# Patient Record
Sex: MALE | Race: ASIAN | NOT HISPANIC OR LATINO | Employment: FULL TIME | URBAN - METROPOLITAN AREA
[De-identification: names, ages, dates, MRNs, and addresses within clinical notes are randomized per-mention and may not be internally consistent; named-entity substitution may affect disease eponyms.]

---

## 2021-04-08 DIAGNOSIS — Z23 ENCOUNTER FOR IMMUNIZATION: ICD-10-CM

## 2021-04-19 ENCOUNTER — IMMUNIZATIONS (OUTPATIENT)
Dept: FAMILY MEDICINE CLINIC | Facility: HOSPITAL | Age: 31
End: 2021-04-19

## 2021-04-19 DIAGNOSIS — Z23 ENCOUNTER FOR IMMUNIZATION: Primary | ICD-10-CM

## 2021-04-19 PROCEDURE — 91300 SARS-COV-2 / COVID-19 MRNA VACCINE (PFIZER-BIONTECH) 30 MCG: CPT

## 2021-04-19 PROCEDURE — 0001A SARS-COV-2 / COVID-19 MRNA VACCINE (PFIZER-BIONTECH) 30 MCG: CPT

## 2021-05-13 ENCOUNTER — IMMUNIZATIONS (OUTPATIENT)
Dept: FAMILY MEDICINE CLINIC | Facility: HOSPITAL | Age: 31
End: 2021-05-13

## 2021-05-13 DIAGNOSIS — Z23 ENCOUNTER FOR IMMUNIZATION: Primary | ICD-10-CM

## 2021-05-13 PROCEDURE — 91300 SARS-COV-2 / COVID-19 MRNA VACCINE (PFIZER-BIONTECH) 30 MCG: CPT

## 2021-05-13 PROCEDURE — 0002A SARS-COV-2 / COVID-19 MRNA VACCINE (PFIZER-BIONTECH) 30 MCG: CPT

## 2021-09-03 ENCOUNTER — NEW PATIENT (OUTPATIENT)
Dept: URBAN - METROPOLITAN AREA CLINIC 6 | Facility: CLINIC | Age: 31
End: 2021-09-03

## 2021-09-03 DIAGNOSIS — H52.13: ICD-10-CM

## 2021-09-03 PROCEDURE — 92004 COMPRE OPH EXAM NEW PT 1/>: CPT

## 2021-09-03 PROCEDURE — 92015 DETERMINE REFRACTIVE STATE: CPT

## 2021-09-03 ASSESSMENT — KERATOMETRY
OS_AXISANGLE2_DEGREES: 79
OS_AXISANGLE_DEGREES: 169
OD_K2POWER_DIOPTERS: 44.25
OS_K2POWER_DIOPTERS: 44.00
OS_K1POWER_DIOPTERS: 43.25
OD_K1POWER_DIOPTERS: 42.75
OD_AXISANGLE2_DEGREES: 86
OD_AXISANGLE_DEGREES: 176

## 2021-09-03 ASSESSMENT — VISUAL ACUITY
OS_CC: J1+
OS_CC: 20/30-2
OD_CC: J1+
OD_CC: 20/20-3

## 2021-09-03 ASSESSMENT — TONOMETRY
OD_IOP_MMHG: 16
OS_IOP_MMHG: 16

## 2021-12-18 ENCOUNTER — IMMUNIZATIONS (OUTPATIENT)
Dept: FAMILY MEDICINE CLINIC | Facility: HOSPITAL | Age: 31
End: 2021-12-18

## 2021-12-18 DIAGNOSIS — Z23 ENCOUNTER FOR IMMUNIZATION: Primary | ICD-10-CM

## 2021-12-18 PROCEDURE — 0001A COVID-19 PFIZER VACC 0.3 ML: CPT

## 2021-12-18 PROCEDURE — 91300 COVID-19 PFIZER VACC 0.3 ML: CPT

## 2022-06-20 ENCOUNTER — OFFICE VISIT (OUTPATIENT)
Dept: FAMILY MEDICINE CLINIC | Facility: CLINIC | Age: 32
End: 2022-06-20
Payer: COMMERCIAL

## 2022-06-20 VITALS
BODY MASS INDEX: 24.8 KG/M2 | DIASTOLIC BLOOD PRESSURE: 70 MMHG | TEMPERATURE: 98.1 F | SYSTOLIC BLOOD PRESSURE: 140 MMHG | WEIGHT: 158 LBS | HEART RATE: 93 BPM | HEIGHT: 67 IN | OXYGEN SATURATION: 99 %

## 2022-06-20 DIAGNOSIS — M99.02 SOMATIC DYSFUNCTION OF THORACIC REGION: ICD-10-CM

## 2022-06-20 DIAGNOSIS — M99.04 SACRAL REGION SOMATIC DYSFUNCTION: ICD-10-CM

## 2022-06-20 DIAGNOSIS — M99.01 CERVICAL SOMATIC DYSFUNCTION: ICD-10-CM

## 2022-06-20 DIAGNOSIS — M54.2 NECK PAIN: Primary | ICD-10-CM

## 2022-06-20 DIAGNOSIS — M54.50 CHRONIC BILATERAL LOW BACK PAIN WITHOUT SCIATICA: ICD-10-CM

## 2022-06-20 DIAGNOSIS — S16.1XXA CERVICAL STRAIN, INITIAL ENCOUNTER: ICD-10-CM

## 2022-06-20 DIAGNOSIS — M99.08 SEGMENTAL AND SOMATIC DYSFUNCTION OF RIB CAGE: ICD-10-CM

## 2022-06-20 DIAGNOSIS — S23.41XA RIB SPRAIN, INITIAL ENCOUNTER: ICD-10-CM

## 2022-06-20 DIAGNOSIS — S29.012A STRAIN OF MUSCLE AND TENDON OF BACK WALL OF THORAX, INITIAL ENCOUNTER: ICD-10-CM

## 2022-06-20 DIAGNOSIS — G89.29 CHRONIC BILATERAL LOW BACK PAIN WITHOUT SCIATICA: ICD-10-CM

## 2022-06-20 DIAGNOSIS — M54.9 UPPER BACK PAIN ON LEFT SIDE: ICD-10-CM

## 2022-06-20 DIAGNOSIS — S39.013A STRAIN OF MUSCLE, FASCIA AND TENDON OF PELVIS, INITIAL ENCOUNTER: ICD-10-CM

## 2022-06-20 DIAGNOSIS — S39.012A STRAIN, SACRAL, INITIAL ENCOUNTER: ICD-10-CM

## 2022-06-20 DIAGNOSIS — M99.05 PELVIC SOMATIC DYSFUNCTION: ICD-10-CM

## 2022-06-20 PROCEDURE — 99204 OFFICE O/P NEW MOD 45 MIN: CPT | Performed by: FAMILY MEDICINE

## 2022-06-20 PROCEDURE — 98927 OSTEOPATH MANJ 5-6 REGIONS: CPT | Performed by: FAMILY MEDICINE

## 2022-06-20 PROCEDURE — 1036F TOBACCO NON-USER: CPT | Performed by: FAMILY MEDICINE

## 2022-06-20 PROCEDURE — 3008F BODY MASS INDEX DOCD: CPT | Performed by: FAMILY MEDICINE

## 2022-06-20 PROCEDURE — 3725F SCREEN DEPRESSION PERFORMED: CPT | Performed by: FAMILY MEDICINE

## 2022-06-20 RX ORDER — FAMOTIDINE 40 MG/1
40 TABLET, FILM COATED ORAL EVERY EVENING
COMMUNITY
Start: 2022-06-06 | End: 2022-09-02

## 2022-06-20 RX ORDER — TRETINOIN 0.5 MG/G
CREAM TOPICAL
COMMUNITY
Start: 2022-05-03

## 2022-06-20 RX ORDER — CLINDAMYCIN PHOSPHATE 10 UG/ML
1 LOTION TOPICAL DAILY
COMMUNITY

## 2022-06-20 NOTE — PROGRESS NOTES
Chief Complaint   Patient presents with    Chest discomfort     New Pt/left side x 1 month  Assessment/Plan:  No heat  Discussed nutrition - protein intake with weight lifting, recoup time, hydration and proper rest between sessions of exercise  BMI Counseling: Body mass index is 24 75 kg/m²  The BMI in normal range  PHQ-2/9 Depression Screening    Little interest or pleasure in doing things: 0 - not at all  Feeling down, depressed, or hopeless: 0 - not at all  PHQ-2 Score: 0  PHQ-2 Interpretation: Negative depression screen          Diagnoses and all orders for this visit:    Neck pain    Upper back pain on left side    Chronic bilateral low back pain without sciatica    Cervical somatic dysfunction    Cervical strain, initial encounter    Somatic dysfunction of thoracic region    Strain of muscle and tendon of back wall of thorax, initial encounter    Rib sprain, initial encounter    Segmental and somatic dysfunction of rib cage    Pelvic somatic dysfunction    Strain of muscle, fascia and tendon of pelvis, initial encounter    Sacral region somatic dysfunction    Strain, sacral, initial encounter    Other orders  -     clindamycin (CLEOCIN T) 1 % lotion; Apply 1 application topically in the morning          Subjective:      Patient ID: William Cardona is a 33 y o  male  First visit  Getting left chest discomfort below left breast off and on past 3 months, gets through the day  Comes and goes  Exercises 5 times a week - weights - does  upper body, chest, arms, shoulders, legs  Can be working chest q 3 days  Left ear pain past 7 years, sharp pain that comes and goes  Has seen ENT, neurology  Brain scan was negative  recommended to see a pain specialist   Also has left upper back pain - rib #1 area and low back pain on and off        The following portions of the patient's history were reviewed and updated as appropriate: allergies, current medications, past family history, past medical history, past social history, past surgical history and problem list   I have spent 45 minutes with Patient  today in which greater than 50% of this time was spent in counseling/coordination of care regarding Risks and benefits of tx options, Intructions for management, Patient and family education, Importance of tx compliance, Risk factor reductions and Impressions  Review of Systems   Constitutional: Negative  HENT: Negative  Eyes: Negative  Respiratory: Negative  Cardiovascular: Positive for chest pain  MS chest pain  Gastrointestinal: Negative  Genitourinary: Negative  Musculoskeletal: Positive for back pain and neck pain  Skin: Negative  Neurological: Negative  Psychiatric/Behavioral: Negative  Objective:      Pulse 93   Temp 98 1 °F (36 7 °C) (Tympanic)   Ht 5' 7" (1 702 m)   Wt 71 7 kg (158 lb)   SpO2 99%   BMI 24 75 kg/m²     Current Outpatient Medications:     clindamycin (CLEOCIN T) 1 % lotion, Apply 1 application topically in the morning, Disp: , Rfl:     famotidine (PEPCID) 40 MG tablet, Take 40 mg by mouth every evening, Disp: , Rfl:     tretinoin (REFISSA) 0 05 % cream, APPLY PEA SIZED AMOUNT TO ENTIRE FACE EVERY NIGHT AT BEDTIME 1 HOUR AFTER WASHING FACE, Disp: , Rfl:   No Known Allergies           Physical Exam  Constitutional:       Appearance: He is well-developed  HENT:      Head: Normocephalic and atraumatic  Right Ear: External ear normal       Left Ear: External ear normal       Nose: Nose normal    Eyes:      Conjunctiva/sclera: Conjunctivae normal       Pupils: Pupils are equal, round, and reactive to light  Cardiovascular:      Rate and Rhythm: Normal rate and regular rhythm  Pulses: Normal pulses  Heart sounds: Normal heart sounds  Pulmonary:      Effort: Pulmonary effort is normal       Breath sounds: Normal breath sounds  Abdominal:      General: Bowel sounds are normal       Palpations: Abdomen is soft  Musculoskeletal:      Cervical back: Normal range of motion and neck supple  Comments: Point tender IS rib 6 -7 mid clavicular line  Supine: C0-1 tender on left - C0 SB R   Sit: Rib #1 left posterior and tender  Prone: Multiple TV foldings  Inferior left PSIS, Sacrum: rotated left, SB R    Skin:     General: Skin is warm and dry  Neurological:      Mental Status: He is alert and oriented to person, place, and time  Deep Tendon Reflexes: Reflexes are normal and symmetric  Psychiatric:         Behavior: Behavior normal          Thought Content:  Thought content normal          Judgment: Judgment normal

## 2022-06-27 NOTE — PROGRESS NOTES
OMT  Performed by: Shayna Hall DO  Authorized by: Shayna Hall DO   Universal Protocol:  Consent: Verbal consent obtained  Risks and benefits: risks, benefits and alternatives were discussed  Consent given by: patient        Procedure Details:     Region evaluated and treated:  Cervical, Sacrum/Pelvis, Pelvis Innominate, Ribs and Thoracic    Thoracic Information  Thoracic Region: T1 - T4 and T5 - T9  Cervical Details:     Examination Method:  Asymmetry, Misalignment, Crepitation, Defects, Masses and Tenderness, Pain    Severity:  Mild    Treatment Method:  Soft Tissue Treatment, Muscle Energy Treatment and High Velocity, Low Amplitude Treatment    Response:  Resolved - The somatic dysfunction is completely resolved without evidence of it ever having been present  Thoracic T1 - T4 details:     Examination Method:  Asymmetry, Misalignment, Crepitation, Defects, Masses and Tenderness, Pain    Severity:  Mild    Treatment Method:  High Velocity, Low Amplitude Treatment    Response:  Resolved    Thoracic T5 - T9 details:     Examination Method:  Asymmetry, Misalignment, Crepitation, Defects, Masses    Severity:  Mild    Treatment Method:  High Velocity, Low Amplitude Treatment    Response:  Resolved - The somatic dysfunction is completely resolved without evidence of it ever having been present  Sacrum/Pelvis details:     Examination Method:  Asymmetry, Misalignment, Crepitation, Defects, Masses and Tenderness, Pain    Severity:  Mild    Treatment Method:  Muscle Energy Treatment    Response:  Resolved - The somatic dysfunction is completely resolved without evidence of it ever having been present  Pelvis Innominate details:     Examination Method:  Asymmetry, Misalignment, Crepitation, Defects, Masses    Severity:  Mild    Treatment Method:  Muscle Energy Treatment    Response:  Resolved - The somatic dysfunction is completely resolved without evidence of it ever having been present      Ribs details: Examination Method:  Asymmetry, Misalignment, Crepitation, Defects, Masses and Tenderness, Pain    Severity:  Mild    Treatment Method:  High Velocity, Low Amplitude Treatment, Muscle Energy Treatment and Myofascial Release Treatment    Response:  Improved - The somatic dysfunction is improved but not completely resolved      Total Regions Treated:  5

## 2022-07-05 ENCOUNTER — OFFICE VISIT (OUTPATIENT)
Dept: FAMILY MEDICINE CLINIC | Facility: CLINIC | Age: 32
End: 2022-07-05
Payer: COMMERCIAL

## 2022-07-05 VITALS
HEART RATE: 85 BPM | WEIGHT: 156 LBS | SYSTOLIC BLOOD PRESSURE: 122 MMHG | TEMPERATURE: 97.6 F | OXYGEN SATURATION: 99 % | BODY MASS INDEX: 24.48 KG/M2 | HEIGHT: 67 IN | DIASTOLIC BLOOD PRESSURE: 70 MMHG

## 2022-07-05 DIAGNOSIS — M54.9 UPPER BACK PAIN ON RIGHT SIDE: Primary | ICD-10-CM

## 2022-07-05 DIAGNOSIS — M99.08 SEGMENTAL AND SOMATIC DYSFUNCTION OF RIB CAGE: ICD-10-CM

## 2022-07-05 DIAGNOSIS — S23.41XD RIB SPRAIN, SUBSEQUENT ENCOUNTER: ICD-10-CM

## 2022-07-05 PROCEDURE — 99213 OFFICE O/P EST LOW 20 MIN: CPT | Performed by: FAMILY MEDICINE

## 2022-07-05 PROCEDURE — 98925 OSTEOPATH MANJ 1-2 REGIONS: CPT | Performed by: FAMILY MEDICINE

## 2022-07-05 NOTE — PROGRESS NOTES
Chief Complaint   Patient presents with    Follow-up     Neck pain     Assessment/Plan:  No heat  Diagnoses and all orders for this visit:    Upper back pain on right side    Rib sprain, subsequent encounter    Segmental and somatic dysfunction of rib cage          Subjective:      Patient ID: Javed Quintero is a 33 y o  male  Chest pain just about gone, no ear pain past 2 weeks  Will be starting to lift again  The following portions of the patient's history were reviewed and updated as appropriate: allergies, current medications, past medical history, past social history, past surgical history and problem list     Review of Systems   Musculoskeletal: Positive for back pain  Objective:      /70 (BP Location: Left arm, Patient Position: Sitting, Cuff Size: Large)   Pulse 85   Temp 97 6 °F (36 4 °C) (Tympanic)   Ht 5' 7" (1 702 m)   Wt 70 8 kg (156 lb)   SpO2 99%   BMI 24 43 kg/m²     Current Outpatient Medications:     clindamycin (CLEOCIN T) 1 % lotion, Apply 1 application topically in the morning, Disp: , Rfl:     famotidine (PEPCID) 40 MG tablet, Take 40 mg by mouth every evening, Disp: , Rfl:     tretinoin (REFISSA) 0 05 % cream, APPLY PEA SIZED AMOUNT TO ENTIRE FACE EVERY NIGHT AT BEDTIME 1 HOUR AFTER WASHING FACE, Disp: , Rfl:   No Known Allergies  Past Surgical History:   Procedure Laterality Date    FINGER SURGERY              Physical Exam  Constitutional:       Appearance: Normal appearance  Musculoskeletal:      Comments: Stand: pelvis level  Sit:  Rib #1 left posterior and tender  Skin:     General: Skin is warm and dry  Neurological:      General: No focal deficit present  Mental Status: He is alert and oriented to person, place, and time  Psychiatric:         Mood and Affect: Mood normal          Behavior: Behavior normal          Thought Content:  Thought content normal          Judgment: Judgment normal

## 2022-07-05 NOTE — PROGRESS NOTES
OMT  Performed by: Ana Hennessy DO  Authorized by: Ana Hennessy DO   Universal Protocol:  Consent: Verbal consent obtained  Risks and benefits: risks, benefits and alternatives were discussed  Consent given by: patient        Procedure Details:     Region evaluated and treated:  Ribs    Ribs details:     Examination Method:  Asymmetry, Misalignment, Crepitation, Defects, Masses and Tenderness, Pain    Severity:  Mild    Treatment Method:  High Velocity, Low Amplitude Treatment    Response:  Resolved - The somatic dysfunction is completely resolved without evidence of it ever having been present      Total Regions Treated:  1

## 2022-09-01 DIAGNOSIS — K21.00 GASTRO-ESOPHAGEAL REFLUX DISEASE WITH ESOPHAGITIS, WITHOUT BLEEDING: ICD-10-CM

## 2022-09-02 RX ORDER — FAMOTIDINE 40 MG/1
TABLET, FILM COATED ORAL
Qty: 90 TABLET | Refills: 1 | Status: SHIPPED | OUTPATIENT
Start: 2022-09-02

## 2022-12-27 ENCOUNTER — OFFICE VISIT (OUTPATIENT)
Dept: FAMILY MEDICINE CLINIC | Facility: CLINIC | Age: 32
End: 2022-12-27

## 2022-12-27 VITALS
SYSTOLIC BLOOD PRESSURE: 126 MMHG | OXYGEN SATURATION: 99 % | HEIGHT: 67 IN | DIASTOLIC BLOOD PRESSURE: 72 MMHG | BODY MASS INDEX: 24.01 KG/M2 | WEIGHT: 153 LBS | HEART RATE: 96 BPM | TEMPERATURE: 98.4 F

## 2022-12-27 DIAGNOSIS — R07.82 INTERCOSTAL PAIN: Primary | ICD-10-CM

## 2022-12-27 DIAGNOSIS — R12 HEART BURN: ICD-10-CM

## 2022-12-27 DIAGNOSIS — R07.9 CHEST PAIN, UNSPECIFIED TYPE: ICD-10-CM

## 2022-12-27 NOTE — PROGRESS NOTES
Name: Pieter Bernardo      : 1990      MRN: [id number]  Encounter Provider: Clifton Torres DO  Encounter Date: 2022   Encounter department: [facility]     Chief Complaint   Patient presents with   • Chest Pain     Chest discomfort L side x few months   BMI Counseling: Body mass index is 23 96 kg/m²  The BMI in normal range  PHQ-2/9 Depression Screening    Little interest or pleasure in doing things: 0 - not at all  Feeling down, depressed, or hopeless: 0 - not at all  PHQ-2 Score: 0  PHQ-2 Interpretation: Negative depression screen           Subjective     Localized left chest pain, feels mostly at HS  Denies etiology  SH: Cyndi  Review of Systems   Constitutional: Negative  HENT: Negative  Eyes: Negative  Respiratory: Negative  Cardiovascular: Negative  Negative for chest pain  Gastrointestinal: Negative  Genitourinary: Negative  Musculoskeletal: Negative  Skin: Negative  Neurological: Negative  Psychiatric/Behavioral: Negative  History reviewed  No pertinent past medical history  Past Surgical History:   Procedure Laterality Date   • FINGER SURGERY       History reviewed  No pertinent family history    Social History     Socioeconomic History   • Marital status: /Civil Union     Spouse name: None   • Number of children: None   • Years of education: None   • Highest education level: None   Occupational History   • None   Tobacco Use   • Smoking status: Never   • Smokeless tobacco: Never   Vaping Use   • Vaping Use: Never used   Substance and Sexual Activity   • Alcohol use: Not Currently     Alcohol/week: 6 0 standard drinks     Types: 6 Cans of beer per week   • Drug use: Never   • Sexual activity: Yes     Partners: Female   Other Topics Concern   • None   Social History Narrative   • None     Social Determinants of Health     Financial Resource Strain: Not on file   Food Insecurity: Not on file Transportation Needs: Not on file   Physical Activity: Not on file   Stress: Not on file   Social Connections: Not on file   Intimate Partner Violence: Not on file   Housing Stability: Not on file     Current Outpatient Medications on File Prior to Visit   Medication Sig   • clindamycin (CLEOCIN T) 1 % lotion Apply 1 application topically in the morning   • famotidine (PEPCID) 40 MG tablet TAKE 1 TABLET BY MOUTH EVERY DAY IN THE EVENING   • tretinoin (REFISSA) 0 05 % cream APPLY PEA SIZED AMOUNT TO ENTIRE FACE EVERY NIGHT AT BEDTIME 1 HOUR AFTER WASHING FACE     No Known Allergies  Immunization History   Administered Date(s) Administered   • COVID-19 PFIZER VACCINE 0 3 ML IM 04/19/2021, 05/13/2021, 12/18/2021       Objective     /72 (BP Location: Left arm, Patient Position: Sitting, Cuff Size: Large)   Pulse 96   Temp 98 4 °F (36 9 °C) (Temporal)   Ht 5' 7" (1 702 m)   Wt 69 4 kg (153 lb)   SpO2 99%   BMI 23 96 kg/m²     Physical Exam  Constitutional:       Appearance: He is well-developed  HENT:      Head: Normocephalic and atraumatic  Right Ear: External ear normal       Left Ear: External ear normal       Nose: Nose normal       Mouth/Throat:      Mouth: Mucous membranes are moist       Pharynx: Oropharynx is clear  Eyes:      Conjunctiva/sclera: Conjunctivae normal       Pupils: Pupils are equal, round, and reactive to light  Cardiovascular:      Rate and Rhythm: Normal rate and regular rhythm  Heart sounds: Normal heart sounds  Pulmonary:      Effort: Pulmonary effort is normal       Breath sounds: Normal breath sounds  Abdominal:      General: Abdomen is flat  Bowel sounds are normal       Palpations: Abdomen is soft  Musculoskeletal:      Cervical back: Normal range of motion and neck supple  Comments: Localized reproducible pain with palpation left ACW  Skin:     General: Skin is warm and dry     Neurological:      Mental Status: He is alert and oriented to person, place, and time  Deep Tendon Reflexes: Reflexes are normal and symmetric  Psychiatric:         Mood and Affect: Mood normal          Behavior: Behavior normal          Thought Content:  Thought content normal          Judgment: Judgment normal        Gavi Lund, DO

## 2023-01-17 ENCOUNTER — HOSPITAL ENCOUNTER (OUTPATIENT)
Dept: RADIOLOGY | Facility: HOSPITAL | Age: 33
Discharge: HOME | End: 2023-01-17

## 2023-01-17 DIAGNOSIS — R07.9 CHEST PAIN, UNSPECIFIED TYPE: ICD-10-CM

## 2023-01-17 DIAGNOSIS — R52 PAIN: ICD-10-CM

## 2023-02-13 ENCOUNTER — EVALUATION (OUTPATIENT)
Dept: PHYSICAL THERAPY | Facility: REHABILITATION | Age: 33
End: 2023-02-13

## 2023-02-13 DIAGNOSIS — R07.82 INTERCOSTAL PAIN: Primary | ICD-10-CM

## 2023-02-13 NOTE — LETTER
2023    NISHANT Kraus    [address] [address]    Patient: Esa Yarbrough   YOB: 1990   Date of Visit: 2023     Encounter Diagnosis     ICD-10-CM    1  Intercostal pain  R07 82           Dear Dr Fernando Starkey:    Thank you for your recent referral of Obdulio Barr  Please review the attached evaluation summary from Obdulio's recent visit  Please verify that you agree with the plan of care by signing the attached order  If you have any questions or concerns, please do not hesitate to call  I sincerely appreciate the opportunity to share in the care of one of your patients and hope to have another opportunity to work with you in the near future  Sincerely,    Rickie Levy, PT      Referring Provider:      I certify that I have read the below Plan of Care and certify the need for these services furnished under this plan of treatment while under my care  NISHANT Kraus    [place] [id number]  Via Fax: [phone]          PT Evaluation     Today's date: 2023  Patient name: Esa Yarbrough  : 1990  MRN: [id number]  Referring provider: Mendez Sahni DO  Dx:   Encounter Diagnosis     ICD-10-CM    1  Intercostal pain  R07 82           Start Time: 911  Stop Time: 1000  Total time in clinic (min): 49 minutes    Assessment  Assessment details: Esa Yarbrough is a 29 y o  male presenting with chronic L sided rib/trunk pain indicative of intercostal dysfunction  Discussed plan moving forward to address pain and improve status  Educated pt on musculoskeletal source of pain  Primary impairments include L sided rib/trunk pain with functional activities, L scapular/shoulder weakness, thoracic AROM dysfunction, paraspinal and scapular motor control dysfunction  Educated pt on anatomy and physiology of diagnosis    Will benefit from skilled PT interventions for community reintegration, ADL management/independence, return to work/sport/hobbies  Impairments: abnormal coordination, abnormal muscle firing, abnormal muscle tone, abnormal or restricted ROM, activity intolerance, impaired physical strength, lacks appropriate home exercise program, pain with function, scapular dyskinesis, poor posture  and poor body mechanics  Functional limitations: weight lifting routine  Symptom irritability: lowBarriers to therapy: none  Understanding of Dx/Px/POC: good   Prognosis: good    Goals    Short Term Goals: In 3 weeks, the patient will:  1  Improve L global scapular strength to 4+/5 MMT  2  Improve thoracic extension AROM to at least 50%  3  Supervision with HEP for self-care    Long Term Goals: In 6 weeks, the patient will:  1  Return to full weight lifting routine without aggravating pain symptomology  2  FOTO to greater than predicted value  3   Independent with HEP for self-care      Plan  Patient would benefit from: skilled physical therapy  Planned modality interventions: manual electrical stimulation  Planned therapy interventions: abdominal trunk stabilization, activity modification, balance, balance/weight bearing training, behavior modification, body mechanics training, community reintegration, coordination, fine motor coordination training, flexibility, functional ROM exercises, gait training, graded activity, graded exercise, graded motor, home exercise program, work reintegration, therapeutic training, therapeutic exercise, therapeutic activities, stretching, strengthening, self care, postural training, patient education, neuromuscular re-education, motor coordination training, massage, manual therapy, joint mobilization and ADL training  Frequency: 1x week  Duration in weeks: 6  Plan of Care beginning date: 2/13/2023  Plan of Care expiration date: 3/27/2023  Treatment plan discussed with: patient        Subjective    Pain Location: L sided chest  Pain Intensity: "fullness, sharp, discomfort" 1-3/10  KRISTINE: potentially from lifting weights, but truly unknown  DOI: about 1 year ago  Aggravating Factors: bench press movement  Alleviating Factors: stretches  Living Situation: independent ADLs  Constitutional S/S: "tingling every now and then"   Dominant Hand: R  Goals: "I'd like to have it feel normal"  PLOF: exercise 4-5 days per week - PPL program      Objective          Postural Findings:     Head Position  Protracted x  Neutral   Retracted   Scapular Position x Protracted   Neutral   Retracted   Thoracic Spine   Inc Kyphosis x Neutral       Lumbar Spine   Inc Lordosis x Neutral  Dec Lordosis   Pelvis   Anterior Tilt x Neutral   Posterior Tilt   Iliac Crest   L elevated x Neutral   R elevated   Feet   Pronated x Neutral   Supinated   Lateral Shift   Right   Left x None      Strength and ROM evaluated B from a regional biomechanical perspective and values relevant to this episode recorded in tables below  ROM:   Joint / Motion  Right  Left    Lumbar/thoracic Flexion  75%     Lumbar/thoracic  Extension  25%     Lumbar/thoracic Sidebending  50% 50%   Lumbar/thoracic rotation 50% 50%        Cerv ROM in all directions [facility]        Sh flexion 150 150   Sh abduction 150 150         Strength: MMT revealed the following findings    Joint Motion Right Left   Latissumus dorsi 4+/5 4/5   Mid trapezius 4+/5 4/5   Low trapezius 4+/5 4/5   Rhomboids 4+/5 4/5   Sh flexion 4+/5 4/5   Sh abduction 4+/5 4/5           Additional Assessments:  Pain with palpation noted: TTP L side chest at 5th through 7th rib  Passive intervertebral motion: hypomobile thoracic spine              Precautions: none    Pertinent Findings:                                    Test / Measure  2/13/2023     FOTO 97     L scapular strength - global 4/5     Thoracic extension AROM 25%         Manuals 2/13            T/s ext sitting OP 5x                                                   Neuro Re-Ed             Open books 5x B T/s ext sitting 5x            T/s ext foam roll             PPU 10x            Foam roll series             Cat-cow- child's pose             Quad T/s reach through                          Ther Ex             HEP review 5'            UBE 3'/3'            Resisted trunk rot 5x B gtb            [place] unilat chest press + rotation                                                                 Ther Activity                                       Gait Training                                       Modalities

## 2023-02-13 NOTE — PROGRESS NOTES
PT Evaluation     Today's date: 2023  Patient name: Melinda Rasheed  : 1990  MRN: [id number]  Referring provider: William Bartholomew DO  Dx:   Encounter Diagnosis     ICD-10-CM    1  Intercostal pain  R07 82           Start Time: 911  Stop Time: 1000  Total time in clinic (min): 49 minutes    Assessment  Assessment details: Melinda Rasheed is a 29 y o  male presenting with chronic L sided rib/trunk pain indicative of intercostal dysfunction  Discussed plan moving forward to address pain and improve status  Educated pt on musculoskeletal source of pain  Primary impairments include L sided rib/trunk pain with functional activities, L scapular/shoulder weakness, thoracic AROM dysfunction, paraspinal and scapular motor control dysfunction  Educated pt on anatomy and physiology of diagnosis  Will benefit from skilled PT interventions for community reintegration, ADL management/independence, return to work/sport/hobbies  Impairments: abnormal coordination, abnormal muscle firing, abnormal muscle tone, abnormal or restricted ROM, activity intolerance, impaired physical strength, lacks appropriate home exercise program, pain with function, scapular dyskinesis, poor posture  and poor body mechanics  Functional limitations: weight lifting routine  Symptom irritability: lowBarriers to therapy: none  Understanding of Dx/Px/POC: good   Prognosis: good    Goals    Short Term Goals: In 3 weeks, the patient will:  1  Improve L global scapular strength to 4+/5 MMT  2  Improve thoracic extension AROM to at least 50%  3  Supervision with HEP for self-care    Long Term Goals: In 6 weeks, the patient will:  1  Return to full weight lifting routine without aggravating pain symptomology  2  FOTO to greater than predicted value  3   Independent with HEP for self-care      Plan  Patient would benefit from: skilled physical therapy  Planned modality interventions: manual electrical stimulation  Planned therapy interventions: abdominal trunk stabilization, activity modification, balance, balance/weight bearing training, behavior modification, body mechanics training, community reintegration, coordination, fine motor coordination training, flexibility, functional ROM exercises, gait training, graded activity, graded exercise, graded motor, home exercise program, work reintegration, therapeutic training, therapeutic exercise, therapeutic activities, stretching, strengthening, self care, postural training, patient education, neuromuscular re-education, motor coordination training, massage, manual therapy, joint mobilization and ADL training  Frequency: 1x week  Duration in weeks: 6  Plan of Care beginning date: 2/13/2023  Plan of Care expiration date: 3/27/2023  Treatment plan discussed with: patient        Subjective    Pain Location: L sided chest  Pain Intensity: "fullness, sharp, discomfort" 1-3/10  KRISTINE: potentially from lifting weights, but truly unknown  DOI: about 1 year ago  Aggravating Factors: bench press movement  Alleviating Factors: stretches  Living Situation: independent ADLs  Constitutional S/S: "tingling every now and then"   Dominant Hand: R  Goals: "I'd like to have it feel normal"  PLOF: exercise 4-5 days per week - PPL program      Objective          Postural Findings:     Head Position  Protracted x  Neutral   Retracted   Scapular Position x Protracted   Neutral   Retracted   Thoracic Spine   Inc Kyphosis x Neutral       Lumbar Spine   Inc Lordosis x Neutral  Dec Lordosis   Pelvis   Anterior Tilt x Neutral   Posterior Tilt   Iliac Crest   L elevated x Neutral   R elevated   Feet   Pronated x Neutral   Supinated   Lateral Shift   Right   Left x None      Strength and ROM evaluated B from a regional biomechanical perspective and values relevant to this episode recorded in tables below       ROM:   Joint / Motion  Right  Left    Lumbar/thoracic Flexion  75%     Lumbar/thoracic  Extension  25% Lumbar/thoracic Sidebending  50% 50%   Lumbar/thoracic rotation 50% 50%        Cerv ROM in all directions [facility]        Sh flexion 150 150   Sh abduction 150 150         Strength: MMT revealed the following findings    Joint Motion Right Left   Latissumus dorsi 4+/5 4/5   Mid trapezius 4+/5 4/5   Low trapezius 4+/5 4/5   Rhomboids 4+/5 4/5   Sh flexion 4+/5 4/5   Sh abduction 4+/5 4/5           Additional Assessments:  Pain with palpation noted: TTP L side chest at 5th through 7th rib  Passive intervertebral motion: hypomobile thoracic spine               Precautions: none    Pertinent Findings:                                    Test / Measure  2/13/2023     FOTO 97     L scapular strength - global 4/5     Thoracic extension AROM 25%         Manuals 2/13            T/s ext sitting OP 5x                                                   Neuro Re-Ed             Open books 5x B            T/s ext sitting 5x            T/s ext foam roll             PPU 10x            Foam roll series             Cat-cow- child's pose             Quad T/s reach through                          Ther Ex             HEP review 5'            UBE 3'/3'            Resisted trunk rot 5x B gtb            Palco unilat chest press + rotation                                                                 Ther Activity                                       Gait Training                                       Modalities

## 2023-02-20 ENCOUNTER — APPOINTMENT (OUTPATIENT)
Dept: PHYSICAL THERAPY | Facility: REHABILITATION | Age: 33
End: 2023-02-20

## 2023-02-28 ENCOUNTER — APPOINTMENT (OUTPATIENT)
Dept: PHYSICAL THERAPY | Facility: REHABILITATION | Age: 33
End: 2023-02-28

## 2023-07-11 ENCOUNTER — OFFICE VISIT (OUTPATIENT)
Dept: GASTROENTEROLOGY | Facility: CLINIC | Age: 33
End: 2023-07-11
Payer: COMMERCIAL

## 2023-07-11 VITALS
HEIGHT: 67 IN | WEIGHT: 150.2 LBS | BODY MASS INDEX: 23.57 KG/M2 | SYSTOLIC BLOOD PRESSURE: 126 MMHG | DIASTOLIC BLOOD PRESSURE: 81 MMHG | HEART RATE: 81 BPM

## 2023-07-11 DIAGNOSIS — K21.9 GASTROESOPHAGEAL REFLUX DISEASE, UNSPECIFIED WHETHER ESOPHAGITIS PRESENT: Primary | ICD-10-CM

## 2023-07-11 DIAGNOSIS — R07.89 CHEST PAIN, ATYPICAL: ICD-10-CM

## 2023-07-11 PROCEDURE — 99244 OFF/OP CNSLTJ NEW/EST MOD 40: CPT | Performed by: INTERNAL MEDICINE

## 2023-07-11 RX ORDER — PANTOPRAZOLE SODIUM 40 MG/1
40 TABLET, DELAYED RELEASE ORAL DAILY
Qty: 30 TABLET | Refills: 4 | Status: SHIPPED | OUTPATIENT
Start: 2023-07-11

## 2023-07-11 RX ORDER — FAMOTIDINE 20 MG/1
20 TABLET, FILM COATED ORAL 2 TIMES DAILY
Qty: 60 TABLET | Refills: 11 | Status: SHIPPED | OUTPATIENT
Start: 2023-07-11

## 2023-07-11 NOTE — ASSESSMENT & PLAN NOTE
Gastroesophageal reflux disease - Patient has the symptoms of chronic acid reflux for a long time. Possible hiatal hernia or LES weakness. Should rule out Conley's esophagus because of chronic symptoms.    -Patient was explained about the lifestyle and dietary modifications. Advised to avoid fatty foods, chocolates, caffeine, alcohol and any other triggering foods. Avoid eating for at least 3 hours before going to bed.    -He reports using pantoprazole every other day along with Pepcid also every other day. I would like him to be on regular standing dose and also check if it helps the chest pain if it is due to the acid reflux. If it does not help chest pain then we can decrease to the minimum dose to control acid reflux.    -Advised to take pantoprazole 40 mg daily along with Pepcid twice a day. -Patient was explained about the lifestyle and dietary modifications. Advised to avoid fatty foods, chocolates, caffeine, alcohol and any other triggering foods. Avoid eating for at least 3 hours before going to bed.    -Scheduled for EGD    -Patient was explained about  the risks and benefits of the procedure. Risks including but not limited to bleeding, infection, perforation were explained in detail. Also explained about less than 100% sensitivity with the exam and other alternatives.

## 2023-07-11 NOTE — PROGRESS NOTES
Consultation - [address] [place] [address] [facility]  Obdulio Bose Come 1990 male         Chief Complaint: GERD, chest pain    HPI: 52-year-old male with history of chronic acid reflux for about 4 years for which he was on pantoprazole in the past but that was changed to Pepcid 40 mg daily. He reports having recurrent episodes of acid reflux and was added on pantoprazole 20 mg every other day and the Pepcid was changed to every other day also. Reports having improvement of acid reflux symptoms on this regimen. He was also seen by ENT. He complains about left-sided chest pain for couple of years and had work-up for musculoskeletal pain and also did physical therapy without any improvement. He is being scheduled for MRI of the spine. Complains about lower back pain. Regular bowel movements and denies any blood or mucus in the stool. Good appetite, no recent weight loss. Denies any difficulty swallowing. No abdominal pain, nausea or vomiting. Chaperon: Ms. Bib Menon: Review of Systems   Constitutional: Negative for activity change, appetite change, chills, diaphoresis, fatigue, fever and unexpected weight change. HENT: Negative for ear discharge, ear pain, facial swelling, hearing loss, nosebleeds, sore throat, tinnitus and voice change. Eyes: Negative for pain, discharge, redness, itching and visual disturbance. Respiratory: Negative for apnea, cough, chest tightness, shortness of breath and wheezing. Cardiovascular: Positive for chest pain. Negative for palpitations. Gastrointestinal:        As noted in HPI   Endocrine: Negative for cold intolerance, heat intolerance and polyuria. Genitourinary: Negative for difficulty urinating, dysuria, flank pain, hematuria and urgency. Musculoskeletal: Positive for back pain. Negative for arthralgias, gait problem, joint swelling and myalgias. Skin: Negative for rash and wound.    Neurological: Negative for dizziness, tremors, seizures, speech difficulty, light-headedness, numbness and headaches. Hematological: Negative for adenopathy. Does not bruise/bleed easily. Psychiatric/Behavioral: Negative for agitation, behavioral problems and confusion. The patient is not nervous/anxious. Past Medical History:   Diagnosis Date   • GERD (gastroesophageal reflux disease)       Past Surgical History:   Procedure Laterality Date   • FINGER SURGERY     • TYMPANOSTOMY TUBE PLACEMENT Left      Social History     Socioeconomic History   • Marital status: /Civil Union     Spouse name: Not on file   • Number of children: Not on file   • Years of education: Not on file   • Highest education level: Not on file   Occupational History   • Not on file   Tobacco Use   • Smoking status: Never   • Smokeless tobacco: Never   Vaping Use   • Vaping Use: Never used   Substance and Sexual Activity   • Alcohol use: Yes     Alcohol/week: 6.0 standard drinks of alcohol     Types: 6 Cans of beer per week     Comment: socially, couple beers during the week   • Drug use: Never   • Sexual activity: Yes     Partners: Female   Other Topics Concern   • Not on file   Social History Narrative   • Not on file     Social Determinants of Health     Financial Resource Strain: Not on file   Food Insecurity: Not on file   Transportation Needs: Not on file   Physical Activity: Not on file   Stress: Not on file   Social Connections: Not on file   Intimate Partner Violence: Not on file   Housing Stability: Not on file     History reviewed. No pertinent family history. Patient has no known allergies.   Current Outpatient Medications   Medication Sig Dispense Refill   • clindamycin (CLEOCIN T) 1 % lotion Apply 1 application topically in the morning     • famotidine (PEPCID) 20 mg tablet Take 1 tablet (20 mg total) by mouth 2 (two) times a day 60 tablet 11   • pantoprazole (PROTONIX) 40 mg tablet Take 1 tablet (40 mg total) by mouth daily 30 tablet 4   • tretinoin (REFISSA) 0.05 % cream APPLY PEA SIZED AMOUNT TO ENTIRE FACE EVERY NIGHT AT BEDTIME 1 HOUR AFTER WASHING FACE       No current facility-administered medications for this visit. Blood pressure 126/81, pulse 81, height 5' 7" (1.702 m), weight 68.1 kg (150 lb 3.2 oz). PHYSICAL EXAM: Physical Exam  Constitutional:       Appearance: He is well-developed. HENT:      Head: Normocephalic and atraumatic. Eyes:      General: No scleral icterus. Right eye: No discharge. Left eye: No discharge. Conjunctiva/sclera: Conjunctivae normal.      Pupils: Pupils are equal, round, and reactive to light. Neck:      Thyroid: No thyromegaly. Vascular: No JVD. Trachea: No tracheal deviation. Cardiovascular:      Rate and Rhythm: Normal rate and regular rhythm. Heart sounds: Normal heart sounds. No murmur heard. No friction rub. No gallop. Pulmonary:      Effort: Pulmonary effort is normal. No respiratory distress. Breath sounds: Normal breath sounds. No wheezing or rales. Chest:      Chest wall: No tenderness. Abdominal:      General: Bowel sounds are normal. There is no distension. Palpations: Abdomen is soft. There is no mass. Tenderness: There is no abdominal tenderness. There is no guarding or rebound. Hernia: No hernia is present. Musculoskeletal:      Cervical back: Neck supple. Lymphadenopathy:      Cervical: No cervical adenopathy. Skin:     General: Skin is warm and dry. Findings: No erythema or rash. Neurological:      Mental Status: He is alert and oriented to person, place, and time. Psychiatric:         Behavior: Behavior normal.         Thought Content:  Thought content normal.        No results found for: "WBC", "HGB", "HCT", "MCV", "PLT"  No results found for: "GLUCOSE", "CALCIUM", "NA", "K", "CO2", "CL", "BUN", "CREATININE"  No results found for: "ALT", "AST", "GGT", "ALKPHOS", "BILITOT"  No results found for: "INR", "PROTIME"    XR ribs left w pa chest min 3 views    Result Date: 1/19/2023  Impression: No acute displaced rib fracture or pathologic bone lesion. No acute cardiopulmonary disease. Workstation performed: [id number]     XR chest pa & lateral    Result Date: 1/19/2023  Impression: No acute cardiopulmonary disease. Workstation performed: [id number]       ASSESSMENT & PLAN:    Gastroesophageal reflux disease  Gastroesophageal reflux disease - Patient has the symptoms of chronic acid reflux for a long time. Possible hiatal hernia or LES weakness. Should rule out Conley's esophagus because of chronic symptoms.    -Patient was explained about the lifestyle and dietary modifications. Advised to avoid fatty foods, chocolates, caffeine, alcohol and any other triggering foods. Avoid eating for at least 3 hours before going to bed.    -He reports using pantoprazole every other day along with Pepcid also every other day. I would like him to be on regular standing dose and also check if it helps the chest pain if it is due to the acid reflux. If it does not help chest pain then we can decrease to the minimum dose to control acid reflux.    -Advised to take pantoprazole 40 mg daily along with Pepcid twice a day. -Patient was explained about the lifestyle and dietary modifications. Advised to avoid fatty foods, chocolates, caffeine, alcohol and any other triggering foods. Avoid eating for at least 3 hours before going to bed.    -Scheduled for EGD    -Patient was explained about  the risks and benefits of the procedure. Risks including but not limited to bleeding, infection, perforation were explained in detail. Also explained about less than 100% sensitivity with the exam and other alternatives. Chest pain, atypical  Appears to be most likely secondary to musculoskeletal pain.   Doubt for any significant GI pathology.    -Treatment plan as described above

## 2023-07-11 NOTE — H&P (VIEW-ONLY)
Consultation - [address]  [facility]  Obdulio Blackwell 1990 male         Chief Complaint: GERD, chest pain    HPI: 36-year-old male with history of chronic acid reflux for about 4 years for which he was on pantoprazole in the past but that was changed to Pepcid 40 mg daily. He reports having recurrent episodes of acid reflux and was added on pantoprazole 20 mg every other day and the Pepcid was changed to every other day also. Reports having improvement of acid reflux symptoms on this regimen. He was also seen by ENT. He complains about left-sided chest pain for couple of years and had work-up for musculoskeletal pain and also did physical therapy without any improvement. He is being scheduled for MRI of the spine. Complains about lower back pain. Regular bowel movements and denies any blood or mucus in the stool. Good appetite, no recent weight loss. Denies any difficulty swallowing. No abdominal pain, nausea or vomiting. Chaperon: Ms. Chelsie Rowe: Review of Systems   Constitutional: Negative for activity change, appetite change, chills, diaphoresis, fatigue, fever and unexpected weight change. HENT: Negative for ear discharge, ear pain, facial swelling, hearing loss, nosebleeds, sore throat, tinnitus and voice change. Eyes: Negative for pain, discharge, redness, itching and visual disturbance. Respiratory: Negative for apnea, cough, chest tightness, shortness of breath and wheezing. Cardiovascular: Positive for chest pain. Negative for palpitations. Gastrointestinal:        As noted in HPI   Endocrine: Negative for cold intolerance, heat intolerance and polyuria. Genitourinary: Negative for difficulty urinating, dysuria, flank pain, hematuria and urgency. Musculoskeletal: Positive for back pain. Negative for arthralgias, gait problem, joint swelling and myalgias. Skin: Negative for rash and wound.    Neurological: Negative for dizziness, tremors, seizures, speech difficulty, light-headedness, numbness and headaches. Hematological: Negative for adenopathy. Does not bruise/bleed easily. Psychiatric/Behavioral: Negative for agitation, behavioral problems and confusion. The patient is not nervous/anxious. Past Medical History:   Diagnosis Date   • GERD (gastroesophageal reflux disease)       Past Surgical History:   Procedure Laterality Date   • FINGER SURGERY     • TYMPANOSTOMY TUBE PLACEMENT Left      Social History     Socioeconomic History   • Marital status: /Civil Union     Spouse name: Not on file   • Number of children: Not on file   • Years of education: Not on file   • Highest education level: Not on file   Occupational History   • Not on file   Tobacco Use   • Smoking status: Never   • Smokeless tobacco: Never   Vaping Use   • Vaping Use: Never used   Substance and Sexual Activity   • Alcohol use: Yes     Alcohol/week: 6.0 standard drinks of alcohol     Types: 6 Cans of beer per week     Comment: socially, couple beers during the week   • Drug use: Never   • Sexual activity: Yes     Partners: Female   Other Topics Concern   • Not on file   Social History Narrative   • Not on file     Social Determinants of Health     Financial Resource Strain: Not on file   Food Insecurity: Not on file   Transportation Needs: Not on file   Physical Activity: Not on file   Stress: Not on file   Social Connections: Not on file   Intimate Partner Violence: Not on file   Housing Stability: Not on file     History reviewed. No pertinent family history. Patient has no known allergies.   Current Outpatient Medications   Medication Sig Dispense Refill   • clindamycin (CLEOCIN T) 1 % lotion Apply 1 application topically in the morning     • famotidine (PEPCID) 20 mg tablet Take 1 tablet (20 mg total) by mouth 2 (two) times a day 60 tablet 11   • pantoprazole (PROTONIX) 40 mg tablet Take 1 tablet (40 mg total) by mouth daily 30 tablet 4   • tretinoin (REFISSA) 0.05 % cream APPLY PEA SIZED AMOUNT TO ENTIRE FACE EVERY NIGHT AT BEDTIME 1 HOUR AFTER WASHING FACE       No current facility-administered medications for this visit. Blood pressure 126/81, pulse 81, height 5' 7" (1.702 m), weight 68.1 kg (150 lb 3.2 oz). PHYSICAL EXAM: Physical Exam  Constitutional:       Appearance: He is well-developed. HENT:      Head: Normocephalic and atraumatic. Eyes:      General: No scleral icterus. Right eye: No discharge. Left eye: No discharge. Conjunctiva/sclera: Conjunctivae normal.      Pupils: Pupils are equal, round, and reactive to light. Neck:      Thyroid: No thyromegaly. Vascular: No JVD. Trachea: No tracheal deviation. Cardiovascular:      Rate and Rhythm: Normal rate and regular rhythm. Heart sounds: Normal heart sounds. No murmur heard. No friction rub. No gallop. Pulmonary:      Effort: Pulmonary effort is normal. No respiratory distress. Breath sounds: Normal breath sounds. No wheezing or rales. Chest:      Chest wall: No tenderness. Abdominal:      General: Bowel sounds are normal. There is no distension. Palpations: Abdomen is soft. There is no mass. Tenderness: There is no abdominal tenderness. There is no guarding or rebound. Hernia: No hernia is present. Musculoskeletal:      Cervical back: Neck supple. Lymphadenopathy:      Cervical: No cervical adenopathy. Skin:     General: Skin is warm and dry. Findings: No erythema or rash. Neurological:      Mental Status: He is alert and oriented to person, place, and time. Psychiatric:         Behavior: Behavior normal.         Thought Content:  Thought content normal.        No results found for: "WBC", "HGB", "HCT", "MCV", "PLT"  No results found for: "GLUCOSE", "CALCIUM", "NA", "K", "CO2", "CL", "BUN", "CREATININE"  No results found for: "ALT", "AST", "GGT", "ALKPHOS", "BILITOT"  No results found for: "INR", "PROTIME"    XR ribs left w pa chest min 3 views    Result Date: 1/19/2023  Impression: No acute displaced rib fracture or pathologic bone lesion. No acute cardiopulmonary disease. Workstation performed: [id number]     XR chest pa & lateral    Result Date: 1/19/2023  Impression: No acute cardiopulmonary disease. Workstation performed: [id number]       ASSESSMENT & PLAN:    Gastroesophageal reflux disease  Gastroesophageal reflux disease - Patient has the symptoms of chronic acid reflux for a long time. Possible hiatal hernia or LES weakness. Should rule out Conley's esophagus because of chronic symptoms.    -Patient was explained about the lifestyle and dietary modifications. Advised to avoid fatty foods, chocolates, caffeine, alcohol and any other triggering foods. Avoid eating for at least 3 hours before going to bed.    -He reports using pantoprazole every other day along with Pepcid also every other day. I would like him to be on regular standing dose and also check if it helps the chest pain if it is due to the acid reflux. If it does not help chest pain then we can decrease to the minimum dose to control acid reflux.    -Advised to take pantoprazole 40 mg daily along with Pepcid twice a day. -Patient was explained about the lifestyle and dietary modifications. Advised to avoid fatty foods, chocolates, caffeine, alcohol and any other triggering foods. Avoid eating for at least 3 hours before going to bed.    -Scheduled for EGD    -Patient was explained about  the risks and benefits of the procedure. Risks including but not limited to bleeding, infection, perforation were explained in detail. Also explained about less than 100% sensitivity with the exam and other alternatives. Chest pain, atypical  Appears to be most likely secondary to musculoskeletal pain.   Doubt for any significant GI pathology.    -Treatment plan as described above

## 2023-07-11 NOTE — ASSESSMENT & PLAN NOTE
Appears to be most likely secondary to musculoskeletal pain.   Doubt for any significant GI pathology.    -Treatment plan as described above

## 2023-07-11 NOTE — PATIENT INSTRUCTIONS
Scheduled date of EGD(as of today):07/31/23  Physician performing EGD:Florencia  Location of EGD:[facility]  Instructions reviewed with patient by:Fernanda KRAFT  Clearances:  None

## 2023-07-28 ENCOUNTER — TELEPHONE (OUTPATIENT)
Age: 33
End: 2023-07-28

## 2023-07-28 NOTE — TELEPHONE ENCOUNTER
Pt calling has [facility] has an upcoming Procedure in [facility] needs to know if this will be billed as Lio Hawkins please call the pt to discuss

## 2023-07-31 ENCOUNTER — ANESTHESIA EVENT (OUTPATIENT)
Dept: GASTROENTEROLOGY | Facility: AMBULARY SURGERY CENTER | Age: 33
End: 2023-07-31

## 2023-07-31 ENCOUNTER — ANESTHESIA (OUTPATIENT)
Dept: GASTROENTEROLOGY | Facility: AMBULARY SURGERY CENTER | Age: 33
End: 2023-07-31

## 2023-07-31 ENCOUNTER — HOSPITAL ENCOUNTER (OUTPATIENT)
Dept: GASTROENTEROLOGY | Facility: AMBULARY SURGERY CENTER | Age: 33
Setting detail: OUTPATIENT SURGERY
Discharge: HOME | End: 2023-07-31
Attending: INTERNAL MEDICINE | Admitting: INTERNAL MEDICINE
Payer: COMMERCIAL

## 2023-07-31 VITALS
RESPIRATION RATE: 16 BRPM | BODY MASS INDEX: 22.73 KG/M2 | HEART RATE: 81 BPM | WEIGHT: 150 LBS | OXYGEN SATURATION: 99 % | TEMPERATURE: 97.3 F | HEIGHT: 68 IN | DIASTOLIC BLOOD PRESSURE: 64 MMHG | SYSTOLIC BLOOD PRESSURE: 115 MMHG

## 2023-07-31 DIAGNOSIS — K21.9 GASTROESOPHAGEAL REFLUX DISEASE, UNSPECIFIED WHETHER ESOPHAGITIS PRESENT: ICD-10-CM

## 2023-07-31 PROCEDURE — 43251 EGD REMOVE LESION SNARE: CPT | Performed by: INTERNAL MEDICINE

## 2023-07-31 PROCEDURE — 88305 TISSUE EXAM BY PATHOLOGIST: CPT | Performed by: PATHOLOGY

## 2023-07-31 PROCEDURE — 43239 EGD BIOPSY SINGLE/MULTIPLE: CPT | Performed by: INTERNAL MEDICINE

## 2023-07-31 RX ORDER — SODIUM CHLORIDE, SODIUM LACTATE, POTASSIUM CHLORIDE, CALCIUM CHLORIDE 600; 310; 30; 20 MG/100ML; MG/100ML; MG/100ML; MG/100ML
125 INJECTION, SOLUTION INTRAVENOUS CONTINUOUS
Status: CANCELLED | OUTPATIENT
Start: 2023-07-31

## 2023-07-31 RX ORDER — SODIUM CHLORIDE, SODIUM LACTATE, POTASSIUM CHLORIDE, CALCIUM CHLORIDE 600; 310; 30; 20 MG/100ML; MG/100ML; MG/100ML; MG/100ML
INJECTION, SOLUTION INTRAVENOUS CONTINUOUS PRN
Status: DISCONTINUED | OUTPATIENT
Start: 2023-07-31 | End: 2023-07-31

## 2023-07-31 RX ORDER — SODIUM CHLORIDE, SODIUM LACTATE, POTASSIUM CHLORIDE, CALCIUM CHLORIDE 600; 310; 30; 20 MG/100ML; MG/100ML; MG/100ML; MG/100ML
125 INJECTION, SOLUTION INTRAVENOUS CONTINUOUS
Status: DISCONTINUED | OUTPATIENT
Start: 2023-07-31 | End: 2023-08-04 | Stop reason: HOSPADM

## 2023-07-31 RX ORDER — PROPOFOL 10 MG/ML
INJECTION, EMULSION INTRAVENOUS AS NEEDED
Status: DISCONTINUED | OUTPATIENT
Start: 2023-07-31 | End: 2023-07-31

## 2023-07-31 RX ORDER — LIDOCAINE HYDROCHLORIDE 10 MG/ML
INJECTION, SOLUTION EPIDURAL; INFILTRATION; INTRACAUDAL; PERINEURAL AS NEEDED
Status: DISCONTINUED | OUTPATIENT
Start: 2023-07-31 | End: 2023-07-31

## 2023-07-31 RX ADMIN — PROPOFOL 50 MG: 10 INJECTION, EMULSION INTRAVENOUS at 11:48

## 2023-07-31 RX ADMIN — SODIUM CHLORIDE, SODIUM LACTATE, POTASSIUM CHLORIDE, AND CALCIUM CHLORIDE 125 ML/HR: .6; .31; .03; .02 INJECTION, SOLUTION INTRAVENOUS at 11:14

## 2023-07-31 RX ADMIN — LIDOCAINE HYDROCHLORIDE 50 MG: 10 INJECTION, SOLUTION EPIDURAL; INFILTRATION; INTRACAUDAL; PERINEURAL at 11:44

## 2023-07-31 RX ADMIN — PROPOFOL 50 MG: 10 INJECTION, EMULSION INTRAVENOUS at 11:50

## 2023-07-31 RX ADMIN — PROPOFOL 100 MG: 10 INJECTION, EMULSION INTRAVENOUS at 11:44

## 2023-07-31 RX ADMIN — SODIUM CHLORIDE, SODIUM LACTATE, POTASSIUM CHLORIDE, AND CALCIUM CHLORIDE: .6; .31; .03; .02 INJECTION, SOLUTION INTRAVENOUS at 11:39

## 2023-07-31 RX ADMIN — PROPOFOL 50 MG: 10 INJECTION, EMULSION INTRAVENOUS at 11:46

## 2023-07-31 NOTE — INTERVAL H&P NOTE
H&P reviewed. After examining the patient I find no changes in the patients condition since the H&P had been written.     Vitals:    07/31/23 1038   BP: 130/74   Pulse: 72   Resp: 16   Temp: (!) 97.3 °F (36.3 °C)   SpO2: 100%

## 2023-07-31 NOTE — ANESTHESIA POSTPROCEDURE EVALUATION
Post-Op Assessment Note    CV Status:  Stable  Pain Score: 0    Pain management: adequate  Multimodal analgesia used between 6 hours prior to anesthesia start to PACU discharge    Mental Status:  Sleepy   Hydration Status:  Stable   PONV Controlled:  None   Airway Patency:  Patent   Two or more mitigation strategies used for obstructive sleep apnea   Post Op Vitals Reviewed: Yes      Staff: CRNA         No notable events documented.     BP   97/53   Temp     Pulse  74   Resp   20   SpO2   99

## 2023-07-31 NOTE — ANESTHESIA PREPROCEDURE EVALUATION
Procedure:  EGD    Relevant Problems   GI/HEPATIC   (+) Gastroesophageal reflux disease        Physical Exam    Airway    Mallampati score: II  TM Distance: >3 FB  Neck ROM: full     Dental   Comment: Denies loose teeth,     Cardiovascular  Cardiovascular exam normal    Pulmonary  Pulmonary exam normal     Other Findings  Portions of exam deferred due to low yield and/or unknown COVID status      Anesthesia Plan  ASA Score- 2     Anesthesia Type- IV sedation with anesthesia with ASA Monitors. Additional Monitors:   Airway Plan:           Plan Factors-Exercise tolerance (METS): >4 METS. Chart reviewed. Existing labs reviewed. Patient summary reviewed. Patient is not a current smoker. Induction- intravenous. Postoperative Plan-     Informed Consent- Anesthetic plan and risks discussed with patient. I personally reviewed this patient with the CRNA. Discussed and agreed on the Anesthesia Plan with the CRNA. Kelvin Horvath

## 2023-08-02 PROCEDURE — 88305 TISSUE EXAM BY PATHOLOGIST: CPT | Performed by: PATHOLOGY

## 2023-10-20 ENCOUNTER — TELEPHONE (OUTPATIENT)
Age: 33
End: 2023-10-20

## 2023-10-20 ENCOUNTER — OFFICE VISIT (OUTPATIENT)
Dept: FAMILY MEDICINE CLINIC | Facility: CLINIC | Age: 33
End: 2023-10-20

## 2023-10-20 VITALS
HEIGHT: 68 IN | BODY MASS INDEX: 23.49 KG/M2 | SYSTOLIC BLOOD PRESSURE: 122 MMHG | DIASTOLIC BLOOD PRESSURE: 78 MMHG | WEIGHT: 155 LBS | HEART RATE: 69 BPM | OXYGEN SATURATION: 98 %

## 2023-10-20 DIAGNOSIS — E78.5 DYSLIPIDEMIA: ICD-10-CM

## 2023-10-20 DIAGNOSIS — K21.9 GASTROESOPHAGEAL REFLUX DISEASE, UNSPECIFIED WHETHER ESOPHAGITIS PRESENT: ICD-10-CM

## 2023-10-20 DIAGNOSIS — Z23 ENCOUNTER FOR IMMUNIZATION: ICD-10-CM

## 2023-10-20 DIAGNOSIS — R07.89 OTHER CHEST PAIN: ICD-10-CM

## 2023-10-20 DIAGNOSIS — R07.89 OTHER CHEST PAIN: Primary | ICD-10-CM

## 2023-10-20 RX ORDER — CYCLOBENZAPRINE HCL 5 MG
5 TABLET ORAL
Qty: 20 TABLET | Refills: 0 | Status: SHIPPED | OUTPATIENT
Start: 2023-10-20

## 2023-10-20 RX ORDER — CYCLOBENZAPRINE HCL 5 MG
5 TABLET ORAL
Qty: 20 TABLET | Refills: 0 | Status: SHIPPED | OUTPATIENT
Start: 2023-10-20 | End: 2023-10-20 | Stop reason: SDUPTHER

## 2023-10-20 NOTE — TELEPHONE ENCOUNTER
Transmission to pharmacy failed (10/20/2023  9:06 AM EDT)     Not a dup    Reason for call:   [x] Refill   [] Prior Auth  [] Other:     Office:   [x] PCP/Provider - Christopher Ruiz  [] Specialty/Provider -     Medication: Cyclobenzaprine     Dose/Frequency: 5mg daily @HS    Quantity: 20    Pharmacy: [facility]     Does the patient have enough for 3 days?    [] Yes   [] No - Send as HP to POD

## 2023-10-20 NOTE — ASSESSMENT & PLAN NOTE
Patient reports symptoms of atypical chest pain which has been ongoing for at least 2 years now. Patient has been evaluated and treated for musculoskeletal chest pain. Symptoms are persisting. Patient is concerned about his heart. EKG in the office does not show any acute changes. His cardiac risk factors include previous history of dyslipidemia. No metabolic labs since 7415. Patient advised metabolic labs. Advised cardiac stress test and outpatient cardiology follow-up. Symptoms are most likely intermittent muscular, patient advised to try warm compresses and gentle range of motion, muscle relaxer only as needed for severe pain. ER parameters discussed.

## 2023-10-20 NOTE — TELEPHONE ENCOUNTER
FYI: patient was returning a phone call from the clinical team in regard to his meds. Patient was told that  after it was completed he would receive a phone call.

## 2023-10-20 NOTE — PROGRESS NOTES
Subjective:      Patient ID: Edmond Vital is a 29 y.o. male. HPI    Patient is here to establish care. Reports symptoms of left-sided chest pain which started almost 2 years ago. Patient underwent evaluation for acid reflux. EGD was reported normal.  Also evaluated for musculoskeletal pain. Patient is concerned about his heart. Reports that symptoms are intermittent not related to muscular activity however when his symptoms started 2 years ago he was doing some muscle strengthening exercises at the gym. .  Patient discontinued gym however symptoms did not improve. Symptoms started in his axilla and radiate to under the nipple area. Patient denies any systemic symptoms during these episodes. Patient states that he has been evaluated by multiple providers but no cardiac investigations were done for  his reassurance. Past Medical History:   Diagnosis Date    GERD (gastroesophageal reflux disease)        Family History   Problem Relation Age of Onset    No Known Problems Mother     No Known Problems Father        Past Surgical History:   Procedure Laterality Date    FINGER SURGERY      TYMPANOSTOMY TUBE PLACEMENT Left         reports that he has never smoked. He has never used smokeless tobacco. He reports current alcohol use of about 6.0 standard drinks of alcohol per week. He reports that he does not use drugs.       Current Outpatient Medications:     clindamycin (CLEOCIN T) 1 % lotion, Apply 1 application topically in the morning, Disp: , Rfl:     cyclobenzaprine (FLEXERIL) 5 mg tablet, Take 1 tablet (5 mg total) by mouth daily at bedtime, Disp: 20 tablet, Rfl: 0    famotidine (PEPCID) 20 mg tablet, Take 1 tablet (20 mg total) by mouth 2 (two) times a day, Disp: 60 tablet, Rfl: 11    minoxidil (ROGAINE) 2 % external solution, Apply 5 Applications topically 2 (two) times a day Per patient uses daily 5Ml, Disp: , Rfl:     pantoprazole (PROTONIX) 40 mg tablet, Take 1 tablet (40 mg total) by mouth daily, Disp: 30 tablet, Rfl: 4    tretinoin (REFISSA) 0.05 % cream, APPLY PEA SIZED AMOUNT TO ENTIRE FACE EVERY NIGHT AT BEDTIME 1 HOUR AFTER WASHING FACE, Disp: , Rfl:     The following portions of the patient's history were reviewed and updated as appropriate: allergies, current medications, past family history, past medical history, past social history, past surgical history and problem list.    Review of Systems   Constitutional: Negative. Respiratory: Negative. Cardiovascular:  Positive for chest pain. Objective:    /78   Pulse 69   Ht 5' 8" (1.727 m)   Wt 70.3 kg (155 lb)   SpO2 98%   BMI 23.57 kg/m²      Physical Exam  Constitutional:       Appearance: Normal appearance. Cardiovascular:      Heart sounds: Normal heart sounds. Pulmonary:      Breath sounds: Normal breath sounds. Musculoskeletal:      Comments: No tenderness/ lumps felt  on palpation   Neurological:      Mental Status: He is oriented to person, place, and time. Psychiatric:         Mood and Affect: Mood normal.           No results found for this or any previous visit (from the past 1008 hour(s)). Assessment/Plan:             Problem List Items Addressed This Visit          Digestive    Gastroesophageal reflux disease     S/p EGD. Patient advised to avoid dietary triggers. Continue medication management per gastro. Other    Other chest pain - Primary     Patient reports symptoms of atypical chest pain which has been ongoing for at least 2 years now. Patient has been evaluated and treated for musculoskeletal chest pain. Symptoms are persisting. Patient is concerned about his heart. EKG in the office does not show any acute changes. His cardiac risk factors include previous history of dyslipidemia. No metabolic labs since 9380. Patient advised metabolic labs. Advised cardiac stress test and outpatient cardiology follow-up.   Symptoms are most likely intermittent muscular, patient advised to try warm compresses and gentle range of motion, muscle relaxer only as needed for severe pain. ER parameters discussed. Relevant Medications    cyclobenzaprine (FLEXERIL) 5 mg tablet    Other Relevant Orders    POCT ECG (Completed)    Comprehensive metabolic panel    Lipid panel    TSH, 3rd generation with Free T4 reflex    Stress test only, exercise    Ambulatory Referral to Cardiology    Encounter for immunization    Relevant Orders    influenza vaccine, quadrivalent, 0.5 mL, preservative-free, for adult and pediatric patients 6 mos+ (AFLURIA, FLUARIX, FLULAVAL, FLUZONE) (Completed)    Dyslipidemia    Relevant Orders    Lipid panel     I have spent a total time of 45 minutes on 10/20/23 in caring for this patient including Diagnostic results, Prognosis, Risks and benefits of tx options, Instructions for management, Patient and family education, Importance of tx compliance, Risk factor reductions, Impressions, Counseling / Coordination of care, Documenting in the medical record, Reviewing / ordering tests, medicine, procedures  , and Obtaining or reviewing history  .

## 2023-10-21 ENCOUNTER — APPOINTMENT (OUTPATIENT)
Dept: LAB | Facility: CLINIC | Age: 33
End: 2023-10-21
Payer: COMMERCIAL

## 2023-10-21 DIAGNOSIS — R07.89 OTHER CHEST PAIN: ICD-10-CM

## 2023-10-21 DIAGNOSIS — E78.5 DYSLIPIDEMIA: ICD-10-CM

## 2023-10-21 LAB
ALBUMIN SERPL BCP-MCNC: 4.2 G/DL (ref 3.5–5)
ALP SERPL-CCNC: 38 U/L (ref 34–104)
ALT SERPL W P-5'-P-CCNC: 18 U/L (ref 7–52)
ANION GAP SERPL CALCULATED.3IONS-SCNC: 9 MMOL/L
AST SERPL W P-5'-P-CCNC: 18 U/L (ref 13–39)
BILIRUB SERPL-MCNC: 0.71 MG/DL (ref 0.2–1)
BUN SERPL-MCNC: 13 MG/DL (ref 5–25)
CALCIUM SERPL-MCNC: 9.2 MG/DL (ref 8.4–10.2)
CHLORIDE SERPL-SCNC: 104 MMOL/L (ref 96–108)
CHOLEST SERPL-MCNC: 182 MG/DL
CO2 SERPL-SCNC: 27 MMOL/L (ref 21–32)
CREAT SERPL-MCNC: 0.84 MG/DL (ref 0.6–1.3)
GFR SERPL CREATININE-BSD FRML MDRD: 115 ML/MIN/1.73SQ M
GLUCOSE P FAST SERPL-MCNC: 76 MG/DL (ref 65–99)
HDLC SERPL-MCNC: 49 MG/DL
LDLC SERPL CALC-MCNC: 116 MG/DL (ref 0–100)
NONHDLC SERPL-MCNC: 133 MG/DL
POTASSIUM SERPL-SCNC: 3.8 MMOL/L (ref 3.5–5.3)
PROT SERPL-MCNC: 7.3 G/DL (ref 6.4–8.4)
SODIUM SERPL-SCNC: 140 MMOL/L (ref 135–147)
TRIGL SERPL-MCNC: 85 MG/DL
TSH SERPL DL<=0.05 MIU/L-ACNC: 1.25 UIU/ML (ref 0.45–4.5)

## 2023-10-21 PROCEDURE — 80053 COMPREHEN METABOLIC PANEL: CPT

## 2023-10-21 PROCEDURE — 36415 COLL VENOUS BLD VENIPUNCTURE: CPT

## 2023-10-21 PROCEDURE — 80061 LIPID PANEL: CPT

## 2023-10-21 PROCEDURE — 84443 ASSAY THYROID STIM HORMONE: CPT

## 2023-10-24 ENCOUNTER — TELEPHONE (OUTPATIENT)
Dept: FAMILY MEDICINE CLINIC | Facility: CLINIC | Age: 33
End: 2023-10-24

## 2023-10-24 NOTE — TELEPHONE ENCOUNTER
----- Message from Carmen Restrepo MD sent at 10/24/2023 12:20 PM EDT -----  Please call patient with normal results.

## 2023-10-25 ENCOUNTER — TELEPHONE (OUTPATIENT)
Dept: ADMINISTRATIVE | Facility: HOSPITAL | Age: 33
End: 2023-10-25

## 2023-11-28 ENCOUNTER — OFFICE VISIT (OUTPATIENT)
Dept: CARDIOLOGY CLINIC | Facility: CLINIC | Age: 33
End: 2023-11-28
Payer: COMMERCIAL

## 2023-11-28 VITALS
SYSTOLIC BLOOD PRESSURE: 120 MMHG | DIASTOLIC BLOOD PRESSURE: 86 MMHG | HEART RATE: 90 BPM | BODY MASS INDEX: 23.72 KG/M2 | WEIGHT: 156 LBS | OXYGEN SATURATION: 99 %

## 2023-11-28 DIAGNOSIS — R07.89 OTHER CHEST PAIN: ICD-10-CM

## 2023-11-28 PROCEDURE — 99243 OFF/OP CNSLTJ NEW/EST LOW 30: CPT | Performed by: INTERNAL MEDICINE

## 2023-11-28 NOTE — PROGRESS NOTES
Cardiology   Obdulio Barr 29 y.o. male MRN: [id number]   Encounter: [id number]        Reason for Consult / Principal Problem: Chest pain    Physician Requesting Consult: Ene Forde MD    PCP: Ene Forde MD        Assessment/Plan:    >> Chest pain  >> GERD  >> Elevated LDL: 116    Plan:    -Atypical sounding chest pain, will get exercise stress test to exclude cardiac chest pain, low pretest probability  -Lipids are being monitored by his primary care physician  -Counseled to go to the ER or call 911 if there is persistent/severe chest pain. -Return to office in 3 months, sooner if testing is abnormal      CC: Chest pain    HPI  29 y.o. male presents with chest pain that has been going on for about a year, it is on and off, located in the left lower chest.  It is not associated with any particular activity or movement. It is not associated with sweating nausea or diaphoresis. It can last for several hours when it occurs. It is about 5 out of 10 in intensity. The patient denies shortness of breath, palpitations, orthopnea, PND, pedal edema, syncope, presyncope, diaphoresis, nausea/vomiting               Physical exam  Objective   Vitals: Blood pressure 120/86, pulse 90, weight 70.8 kg (156 lb), SpO2 99 %. General:  AO x3, no acute distress  Cardiac:  S1-S2 normal,  No murmurs, rubs or gallops, JVP: normal  Lungs:  Clear to auscultation bilaterally, no wheezing or crackles. Abdomen:  Soft, nontender, nondistended. Extremities:  Warm, well perfused, pulses palpable, no ulcers or rashes. Edema:absent  Neuro: Grossly nonfocall        ======================================================  TREADMILL STRESS  No results found for this or any previous visit.     ----------------------------------------------------------------------------------------------  NUCLEAR STRESS TEST: No results found for this or any previous visit.     No results found for this or any previous visit.      --------------------------------------------------------------------------------  CATH:  No results found for this or any previous visit.    --------------------------------------------------------------------------------  ECHO:   No results found for this or any previous visit. No results found for this or any previous visit.    --------------------------------------------------------------------------------  HOLTER  No results found for this or any previous visit.    --------------------------------------------------------------------------------  CAROTIDS  No results found for this or any previous visit. Diagnoses and all orders for this visit:    Other chest pain  -     Ambulatory Referral to Cardiology  -     Echo stress test, exercise; Future       ======================================================          Review of Systems  ROS as noted above, otherwise 12 point review of systems was performed and is negative. Historical Information   Past Medical History:   Diagnosis Date    GERD (gastroesophageal reflux disease)      Past Surgical History:   Procedure Laterality Date    FINGER SURGERY      TYMPANOSTOMY TUBE PLACEMENT Left      Social History     Substance and Sexual Activity   Alcohol Use Yes    Alcohol/week: 6.0 standard drinks of alcohol    Types: 6 Cans of beer per week    Comment: socially, couple beers during the week     Social History     Substance and Sexual Activity   Drug Use Never     Social History     Tobacco Use   Smoking Status Never   Smokeless Tobacco Never     Family History   Problem Relation Age of Onset    No Known Problems Mother     No Known Problems Father        Meds/Allergies   Hospital Medications:   No current facility-administered medications for this visit. Home Medications: (Not in a hospital admission)      No Known Allergies      Portions of the record may have been created with voice recognition software.   Occasional wrong words or "sound a like" substitutions may have occurred due to the inherent limitations of voice recognition software. Read the chart carefully and recognize, using context, where substitutions have occurred.

## 2023-11-28 NOTE — LETTER
20 November 28, 2023     Demetrius Jordan MD  [address] [facility]    Patient: Ciara Tamayo   YOB: 1990   Date of Visit: 11/28/2023       Dear Dr. Leanord Ganser:    Thank you for referring Ciara Tamayo to me for evaluation. Below are my notes for this consultation. If you have questions, please do not hesitate to call me. I look forward to following your patient along with you. Sincerely,        René Kelly MD        CC: No Recipients    René Kelly MD  11/28/2023  4:35 PM  Incomplete  Cardiology   Obdulio Barr 29 y.o. male MRN: [id number]   Encounter: [id number]        Reason for Consult / Principal Problem: Chest pain    Physician Requesting Consult: Demetrius Jordan MD    PCP: Demetrius Jordan MD        Assessment/Plan:    >> Chest pain  >> GERD  >> Elevated LDL: 116    Plan:    -Atypical sounding chest pain, will get exercise stress test to exclude cardiac chest pain, low pretest probability  -Lipids are being monitored by his primary care physician  -Return to office in 3 months, sooner if testing is abnormal      CC: Chest pain    HPI  29 y.o. male presents with chest pain that has been going on for about a year, it is on and off, located in the left lower chest.  It is not associated with any particular activity or movement. It is not associated with sweating nausea or diaphoresis. It can last for several hours when it occurs. It is about 5 out of 10 in intensity. The patient denies shortness of breath, palpitations, orthopnea, PND, pedal edema, syncope, presyncope, diaphoresis, nausea/vomiting               Physical exam  Objective  Vitals: Blood pressure 120/86, pulse 90, weight 70.8 kg (156 lb), SpO2 99 %. General:  AO x3, no acute distress  Cardiac:  S1-S2 normal,  No murmurs, rubs or gallops, JVP: normal  Lungs:  Clear to auscultation bilaterally, no wheezing or crackles. Abdomen:  Soft, nontender, nondistended.   Extremities:  Warm, well perfused, pulses palpable, no ulcers or rashes. Edema:absent  Neuro: Grossly nonfocall        ======================================================  TREADMILL STRESS  No results found for this or any previous visit.     ----------------------------------------------------------------------------------------------  NUCLEAR STRESS TEST: No results found for this or any previous visit. No results found for this or any previous visit.      --------------------------------------------------------------------------------  CATH:  No results found for this or any previous visit.    --------------------------------------------------------------------------------  ECHO:   No results found for this or any previous visit. No results found for this or any previous visit.    --------------------------------------------------------------------------------  HOLTER  No results found for this or any previous visit.    --------------------------------------------------------------------------------  CAROTIDS  No results found for this or any previous visit. Diagnoses and all orders for this visit:    Other chest pain  -     Ambulatory Referral to Cardiology  -     Echo stress test, exercise; Future       ======================================================          Review of Systems  ROS as noted above, otherwise 12 point review of systems was performed and is negative.      Historical Information  Past Medical History:   Diagnosis Date   • GERD (gastroesophageal reflux disease)      Past Surgical History:   Procedure Laterality Date   • FINGER SURGERY     • TYMPANOSTOMY TUBE PLACEMENT Left      Social History     Substance and Sexual Activity   Alcohol Use Yes   • Alcohol/week: 6.0 standard drinks of alcohol   • Types: 6 Cans of beer per week    Comment: socially, couple beers during the week     Social History     Substance and Sexual Activity   Drug Use Never     Social History     Tobacco Use   Smoking Status Never Smokeless Tobacco Never     Family History   Problem Relation Age of Onset   • No Known Problems Mother    • No Known Problems Father        Meds/Allergies  Hospital Medications:   No current facility-administered medications for this visit. Home Medications: (Not in a hospital admission)      No Known Allergies      Portions of the record may have been created with voice recognition software. Occasional wrong words or "sound a like" substitutions may have occurred due to the inherent limitations of voice recognition software. Read the chart carefully and recognize, using context, where substitutions have occurred.

## 2023-12-27 DIAGNOSIS — R07.89 OTHER CHEST PAIN: ICD-10-CM

## 2024-01-02 NOTE — TELEPHONE ENCOUNTER
Patient returned call. Is waiting for a call back. Was unsure what patient was to be made aware of.

## 2024-01-02 NOTE — TELEPHONE ENCOUNTER
Patient did request the refill. He advised he did see cardiology and he was advised his pain is muscular.   This medication seemed to help when he took it.

## 2024-01-03 RX ORDER — CYCLOBENZAPRINE HCL 5 MG
5 TABLET ORAL
Qty: 20 TABLET | Refills: 0 | Status: SHIPPED | OUTPATIENT
Start: 2024-01-03

## 2024-04-18 ENCOUNTER — OFFICE VISIT (OUTPATIENT)
Dept: URGENT CARE | Facility: CLINIC | Age: 34
End: 2024-04-18
Payer: COMMERCIAL

## 2024-04-18 VITALS
TEMPERATURE: 98 F | WEIGHT: 150 LBS | BODY MASS INDEX: 22.81 KG/M2 | SYSTOLIC BLOOD PRESSURE: 125 MMHG | RESPIRATION RATE: 12 BRPM | OXYGEN SATURATION: 100 % | HEART RATE: 69 BPM | DIASTOLIC BLOOD PRESSURE: 76 MMHG

## 2024-04-18 DIAGNOSIS — H66.92 LEFT OTITIS MEDIA, UNSPECIFIED OTITIS MEDIA TYPE: ICD-10-CM

## 2024-04-18 DIAGNOSIS — J01.00 ACUTE MAXILLARY SINUSITIS, RECURRENCE NOT SPECIFIED: Primary | ICD-10-CM

## 2024-04-18 PROCEDURE — 99213 OFFICE O/P EST LOW 20 MIN: CPT | Performed by: PHYSICIAN ASSISTANT

## 2024-04-18 RX ORDER — AMOXICILLIN 500 MG/1
500 CAPSULE ORAL EVERY 8 HOURS SCHEDULED
Qty: 30 CAPSULE | Refills: 0 | Status: SHIPPED | OUTPATIENT
Start: 2024-04-18 | End: 2024-04-28

## 2024-04-18 RX ORDER — FLUTICASONE PROPIONATE 50 MCG
1 SPRAY, SUSPENSION (ML) NASAL DAILY
Qty: 9.9 ML | Refills: 0 | Status: SHIPPED | OUTPATIENT
Start: 2024-04-18

## 2024-04-18 RX ORDER — FINASTERIDE 1 MG/1
1 TABLET, FILM COATED ORAL DAILY
COMMUNITY
Start: 2024-03-29

## 2024-04-18 NOTE — PROGRESS NOTES
[facility]        NAME: Obdulio Barr is a 34 y.o. male  : 1990    MRN: [id number]  DATE: 2024  TIME: 12:40 PM    /76   Pulse 69   Temp 98 °F (36.7 °C) (Temporal)   Resp 12   Wt 68 kg (150 lb) Comment: stated  SpO2 100%   BMI 22.81 kg/m²     Assessment and Plan   Acute maxillary sinusitis, recurrence not specified [J01.00]  1. Acute maxillary sinusitis, recurrence not specified  amoxicillin (AMOXIL) 500 mg capsule    fluticasone (FLONASE) 50 mcg/act nasal spray      2. Left otitis media, unspecified otitis media type  amoxicillin (AMOXIL) 500 mg capsule    fluticasone (FLONASE) 50 mcg/act nasal spray            Patient Instructions       Follow up with PCP in 3-5 days.  Proceed to  ER if symptoms worsen.    Chief Complaint     Chief Complaint   Patient presents with    Cough     Reports cough, congestion and occasional sore throat x 6 days and symptoms have not improved. States he used Sudafed, DayQuil/NyQuil which have been somewhat effective. Last dose of Sudafed and Dayquil this morning. States family members also came down with similar symptoms.          History of Present Illness       Pt with 1 week nasal congestion and cough    Cough        Review of Systems   Review of Systems   Constitutional: Negative.    HENT:  Positive for congestion, sinus pressure and sinus pain.    Eyes: Negative.    Respiratory:  Positive for cough.    Cardiovascular: Negative.    Gastrointestinal: Negative.    Endocrine: Negative.    Genitourinary: Negative.    Musculoskeletal: Negative.    Skin: Negative.    Allergic/Immunologic: Negative.    Neurological: Negative.    Hematological: Negative.    Psychiatric/Behavioral: Negative.     All other systems reviewed and are negative.        Current Medications       Current Outpatient Medications:     amoxicillin (AMOXIL) 500 mg capsule, Take 1 capsule (500 mg total) by mouth every 8 (eight) hours for 10 days, Disp: 30 capsule, Rfl: 0     clindamycin (CLEOCIN T) 1 % lotion, Apply 1 application topically in the morning, Disp: , Rfl:     famotidine (PEPCID) 20 mg tablet, Take 1 tablet (20 mg total) by mouth 2 (two) times a day, Disp: 60 tablet, Rfl: 11    finasteride (PROPECIA) 1 MG tablet, Take 1 mg by mouth daily, Disp: , Rfl:     fluticasone (FLONASE) 50 mcg/act nasal spray, 1 spray into each nostril daily, Disp: 9.9 mL, Rfl: 0    tretinoin (REFISSA) 0.05 % cream, APPLY PEA SIZED AMOUNT TO ENTIRE FACE EVERY NIGHT AT BEDTIME 1 HOUR AFTER WASHING FACE, Disp: , Rfl:     cyclobenzaprine (FLEXERIL) 5 mg tablet, TAKE 1 TABLET BY MOUTH AT BEDTIME (Patient not taking: Reported on 4/18/2024), Disp: 20 tablet, Rfl: 0    minoxidil (ROGAINE) 2 % external solution, Apply 5 Applications topically 2 (two) times a day Per patient uses daily 5Ml (Patient not taking: Reported on 11/28/2023), Disp: , Rfl:     pantoprazole (PROTONIX) 40 mg tablet, Take 1 tablet (40 mg total) by mouth daily (Patient not taking: Reported on 11/28/2023), Disp: 30 tablet, Rfl: 4    Current Allergies     Allergies as of 04/18/2024    (No Known Allergies)            The following portions of the patient's history were reviewed and updated as appropriate: allergies, current medications, past family history, past medical history, past social history, past surgical history and problem list.     Past Medical History:   Diagnosis Date    GERD (gastroesophageal reflux disease)        Past Surgical History:   Procedure Laterality Date    FINGER SURGERY      TYMPANOSTOMY TUBE PLACEMENT Left        Family History   Problem Relation Age of Onset    No Known Problems Mother     No Known Problems Father          Medications have been verified.        Objective   /76   Pulse 69   Temp 98 °F (36.7 °C) (Temporal)   Resp 12   Wt 68 kg (150 lb) Comment: stated  SpO2 100%   BMI 22.81 kg/m²        Physical Exam     Physical Exam  Vitals and nursing note reviewed.   Constitutional:       Appearance:  Normal appearance. He is normal weight.   HENT:      Head: Normocephalic and atraumatic.      Right Ear: Tympanic membrane, ear canal and external ear normal.      Left Ear: Ear canal and external ear normal.      Ears:      Comments: Left tm injected       Nose: Congestion and rhinorrhea present.      Comments: Boggy mucosa  yellow d/c  max sinus tender      Mouth/Throat:      Mouth: Mucous membranes are moist.   Eyes:      Extraocular Movements: Extraocular movements intact.      Conjunctiva/sclera: Conjunctivae normal.      Pupils: Pupils are equal, round, and reactive to light.   Cardiovascular:      Rate and Rhythm: Normal rate and regular rhythm.      Pulses: Normal pulses.      Heart sounds: Normal heart sounds.   Pulmonary:      Effort: Pulmonary effort is normal.      Breath sounds: Normal breath sounds.   Abdominal:      Palpations: Abdomen is soft.   Musculoskeletal:         General: Normal range of motion.      Cervical back: Normal range of motion and neck supple.   Skin:     General: Skin is warm.      Capillary Refill: Capillary refill takes less than 2 seconds.   Neurological:      General: No focal deficit present.      Mental Status: He is alert and oriented to person, place, and time.

## 2024-06-20 ENCOUNTER — TELEPHONE (OUTPATIENT)
Age: 34
End: 2024-06-20

## 2024-06-20 NOTE — TELEPHONE ENCOUNTER
Pt called stating he saw Dr. Carcamo about a year ago for some chest pain that he was having and it's back again.  It is happening more frequently throughout the day.  Advised to seek ER- but patient stated Dr. Carcamo had determined it was more muscle related underneath his lower left pec.  He did not want to go to the ER.  No avails any time soon, warm transferred to clerical for further assistance.

## 2024-06-24 ENCOUNTER — OFFICE VISIT (OUTPATIENT)
Dept: FAMILY MEDICINE CLINIC | Facility: CLINIC | Age: 34
End: 2024-06-24
Payer: COMMERCIAL

## 2024-06-24 VITALS
OXYGEN SATURATION: 100 % | WEIGHT: 152 LBS | SYSTOLIC BLOOD PRESSURE: 120 MMHG | HEIGHT: 68 IN | HEART RATE: 87 BPM | BODY MASS INDEX: 23.04 KG/M2 | DIASTOLIC BLOOD PRESSURE: 72 MMHG

## 2024-06-24 DIAGNOSIS — R07.89 OTHER CHEST PAIN: Primary | ICD-10-CM

## 2024-06-24 PROCEDURE — 99213 OFFICE O/P EST LOW 20 MIN: CPT | Performed by: FAMILY MEDICINE

## 2024-06-24 RX ORDER — CYCLOBENZAPRINE HCL 10 MG
10 TABLET ORAL
Qty: 30 TABLET | Refills: 0 | Status: SHIPPED | OUTPATIENT
Start: 2024-06-24

## 2024-06-24 NOTE — PROGRESS NOTES
Subjective:      Patient ID: Obdulio Barr is a 34 y.o. male.    HPI    Patient is following up on left-sided chest pain which she has been experiencing for almost 3 years now.  Patient has undergone EGD and evaluated by cardiologist.   Reports that symptoms are intermittent not related to muscular activity however when his symptoms started 2 years ago he was doing some muscle strengthening exercises at the gym..  Patient discontinued gym however symptoms did not improve.  Symptoms started in his axilla and radiate to under the nipple area.  Patient denies any systemic symptoms during these episodes.  Patient states that he has been evaluated by multiple providers but no cardiac investigations were done for  his reassurance.  So patient was referred to cardiologist who advised that the atypical chest pain was unlikely due to heart.      Past Medical History:   Diagnosis Date    GERD (gastroesophageal reflux disease)        Family History   Problem Relation Age of Onset    No Known Problems Mother     No Known Problems Father        Past Surgical History:   Procedure Laterality Date    FINGER SURGERY      TYMPANOSTOMY TUBE PLACEMENT Left         reports that he has never smoked. He has never used smokeless tobacco. He reports current alcohol use of about 6.0 standard drinks of alcohol per week. He reports that he does not use drugs.      Current Outpatient Medications:     clindamycin (CLEOCIN T) 1 % lotion, Apply 1 application topically in the morning, Disp: , Rfl:     famotidine (PEPCID) 20 mg tablet, Take 1 tablet (20 mg total) by mouth 2 (two) times a day, Disp: 60 tablet, Rfl: 11    finasteride (PROPECIA) 1 MG tablet, Take 1 mg by mouth daily, Disp: , Rfl:     tretinoin (REFISSA) 0.05 % cream, APPLY PEA SIZED AMOUNT TO ENTIRE FACE EVERY NIGHT AT BEDTIME 1 HOUR AFTER WASHING FACE, Disp: , Rfl:     cyclobenzaprine (FLEXERIL) 5 mg tablet, TAKE 1 TABLET BY MOUTH AT BEDTIME (Patient not taking: Reported on  "4/18/2024), Disp: 20 tablet, Rfl: 0    fluticasone (FLONASE) 50 mcg/act nasal spray, 1 spray into each nostril daily (Patient not taking: Reported on 6/24/2024), Disp: 9.9 mL, Rfl: 0    minoxidil (ROGAINE) 2 % external solution, Apply 5 Applications topically 2 (two) times a day Per patient uses daily 5Ml (Patient not taking: Reported on 11/28/2023), Disp: , Rfl:     pantoprazole (PROTONIX) 40 mg tablet, Take 1 tablet (40 mg total) by mouth daily (Patient not taking: Reported on 11/28/2023), Disp: 30 tablet, Rfl: 4    The following portions of the patient's history were reviewed and updated as appropriate: allergies, current medications, past family history, past medical history, past social history, past surgical history and problem list.    Review of Systems   Respiratory: Negative.     Cardiovascular: Negative.        Left-sided chest wall pain    Objective:    /72   Pulse 87   Ht 5' 8\" (1.727 m)   Wt 68.9 kg (152 lb)   SpO2 100%   BMI 23.11 kg/m²      Physical Exam  Constitutional:       Appearance: Normal appearance.   Cardiovascular:      Heart sounds: Normal heart sounds.   Pulmonary:      Breath sounds: Normal breath sounds.           No results found for this or any previous visit (from the past 1008 hour(s)).    Assessment/Plan:    No problem-specific Assessment & Plan notes found for this encounter.           Problem List Items Addressed This Visit          Surgery/Wound/Pain    Other chest pain - Primary     Intermittent episodes of left-sided chest wall pain with no specific pointers.  Initially responded well to the lower dose of Flexeril but later the low-dose stopped helping so patient is requesting to increase the dose to 10 mg.  He should continue with warm compresses, activity modification and over-the-counter Tylenol/topical pain relief medication as need be.  If symptoms persist I would encourage him to contact his cardiologist to discuss cardiac testing if need be.         Relevant " Medications    cyclobenzaprine (FLEXERIL) 10 mg tablet

## 2024-06-26 NOTE — ASSESSMENT & PLAN NOTE
Intermittent episodes of left-sided chest wall pain with no specific pointers.  Initially responded well to the lower dose of Flexeril but later the low-dose stopped helping so patient is requesting to increase the dose to 10 mg.  He should continue with warm compresses, activity modification and over-the-counter Tylenol/topical pain relief medication as need be.  If symptoms persist I would encourage him to contact his cardiologist to discuss cardiac testing if need be.

## 2024-08-13 DIAGNOSIS — R07.89 OTHER CHEST PAIN: ICD-10-CM

## 2024-08-15 RX ORDER — CYCLOBENZAPRINE HCL 10 MG
10 TABLET ORAL
Qty: 30 TABLET | Refills: 0 | Status: SHIPPED | OUTPATIENT
Start: 2024-08-15

## 2024-09-24 ENCOUNTER — OFFICE VISIT (OUTPATIENT)
Dept: FAMILY MEDICINE CLINIC | Facility: CLINIC | Age: 34
End: 2024-09-24
Payer: COMMERCIAL

## 2024-09-24 VITALS
HEART RATE: 56 BPM | OXYGEN SATURATION: 99 % | RESPIRATION RATE: 16 BRPM | SYSTOLIC BLOOD PRESSURE: 124 MMHG | WEIGHT: 153 LBS | DIASTOLIC BLOOD PRESSURE: 74 MMHG | BODY MASS INDEX: 23.19 KG/M2 | HEIGHT: 68 IN

## 2024-09-24 DIAGNOSIS — Z00.00 WELL ADULT EXAM: Primary | ICD-10-CM

## 2024-09-24 DIAGNOSIS — E78.2 MIXED HYPERLIPIDEMIA: ICD-10-CM

## 2024-09-24 DIAGNOSIS — E55.9 VITAMIN D DEFICIENCY: ICD-10-CM

## 2024-09-24 DIAGNOSIS — Z23 ENCOUNTER FOR IMMUNIZATION: ICD-10-CM

## 2024-09-24 DIAGNOSIS — R07.89 OTHER CHEST PAIN: ICD-10-CM

## 2024-09-24 DIAGNOSIS — M54.50 LOW BACK PAIN, UNSPECIFIED BACK PAIN LATERALITY, UNSPECIFIED CHRONICITY, UNSPECIFIED WHETHER SCIATICA PRESENT: ICD-10-CM

## 2024-09-24 DIAGNOSIS — K21.9 GASTROESOPHAGEAL REFLUX DISEASE WITHOUT ESOPHAGITIS: ICD-10-CM

## 2024-09-24 DIAGNOSIS — E53.8 B12 DEFICIENCY: ICD-10-CM

## 2024-09-24 DIAGNOSIS — Z23 NEED FOR VACCINATION: ICD-10-CM

## 2024-09-24 PROCEDURE — 99395 PREV VISIT EST AGE 18-39: CPT | Performed by: FAMILY MEDICINE

## 2024-09-24 PROCEDURE — 90656 IIV3 VACC NO PRSV 0.5 ML IM: CPT | Performed by: FAMILY MEDICINE

## 2024-09-24 PROCEDURE — 90471 IMMUNIZATION ADMIN: CPT | Performed by: FAMILY MEDICINE

## 2024-09-24 NOTE — PROGRESS NOTES
Ambulatory Visit  Name: Obdulio Barr      : 1990      MRN: [id number]  Encounter Provider: Quintin Steward MD  Encounter Date: 2024   Encounter department: [facility]    Assessment & Plan  Well adult exam    Orders:    Vitamin D 25 hydroxy    Lipid Panel with Direct LDL reflex    Comprehensive metabolic panel    CBC and differential    Vitamin B12  Overall the patient is doing quite well and has no major complaints.  Lab orders have been placed to further evaluate his overall condition.  He was advised that these labs are fasting and they can be done at any [facility]'s location.  I will reach out to the patient once his lab results are in the system for any further advisement.  Gastroesophageal reflux disease without esophagitis       The patient does have GERD that is well-controlled with Pepcid.  Vitamin D deficiency    Orders:    Vitamin D 25 hydroxy    Mixed hyperlipidemia    Orders:    Lipid Panel with Direct LDL reflex  The patient has been following a relatively healthy diet, with the occasional junk food.  He was encouraged to continue his healthy eating.  He has not been exercising recently as his family just recently welcomed a new baby who is now 2 months old.  I recommended that the patient try to get out and do some physical activity a few times a week even if it is just doing some small walks after dinner and getting out with his family.  The patient recognized the benefit of physical activity and advised he would try to get out for more walks.  B12 deficiency    Orders:    Vitamin B12    Low back pain, unspecified back pain laterality, unspecified chronicity, unspecified whether sciatica present       He reports some generalized low back pain that occurs at night and in the morning.  He has tried multiple beds but continues to have symptoms.  He was recommended to possibly partake in a physical therapy program, or work on generalized core strengthening.  The  patient will reach out to the office if he would like a PT prescription placed.  Other chest pain       He does continue to have the left-sided chest pain that he saw  for back in June.  He did see a cardiologist who believed most of his symptoms were muscle in nature.  However, he was recommended to have a stress test done.  Patient has not yet scheduled this test and was encouraged to schedule this at his earliest convenience to rule out any cardiac pathology.  In the meantime he will continue to take his Flexeril as needed at bedtime.  Encounter for immunization         Need for vaccination    Orders:    influenza vaccine preservative-free 0.5 mL IM (Fluzone, Afluria, Fluarix, Flulaval)       History of Present Illness     HPI      Overall doing well. Sees eye doctor regularly. Sees dentist twice a year. Diet is fair, tries to eat a balanced diet with occasional junk food. He currently not exercising due to busy schedule with two kids.     He saw Dr. Carcamo in June for chest pain that she believed to be muscle in origin. He does take the flexeril every other night for the pain that offer relief when taking it. He started PT for this issue but was not offering relief. He did see a cardiologist who ordered a stress test to r/o hear pathology. He is planning to schedule that.     He has noted left sided ear pain that is intermittent. He has seen multiple doctors that are unable to determine the origin of the pain. Not taking anything for the pain. Not limiting ADLs or function. No hearing problems.     He has had low back pain that started after sleeping in an old bed. He has pain at night and in the morning. Pain is across the low back with no radiating leg pain. No numbness or tingling.         Review of Systems   Constitutional:  Negative for chills and fever.   HENT:  Positive for ear pain. Negative for sore throat.    Eyes:  Negative for pain and visual disturbance.   Respiratory:  Negative for cough  "and shortness of breath.    Cardiovascular:  Negative for chest pain and palpitations.   Gastrointestinal:  Negative for abdominal pain, constipation, diarrhea, nausea and vomiting.   Genitourinary:  Negative for dysuria and hematuria.   Musculoskeletal:  Positive for back pain. Negative for arthralgias.   Skin:  Negative for color change and rash.   Allergic/Immunologic: Negative for food allergies.   Neurological:  Negative for dizziness, seizures, syncope and headaches.   All other systems reviewed and are negative.          Objective     /74   Pulse 56   Resp 16   Ht 5' 8\" (1.727 m)   Wt 69.4 kg (153 lb)   SpO2 99%   BMI 23.26 kg/m²     Physical Exam  Constitutional:       Appearance: Normal appearance.   HENT:      Head: Normocephalic and atraumatic.      Nose: Nose normal.   Eyes:      Extraocular Movements: Extraocular movements intact.   Cardiovascular:      Rate and Rhythm: Normal rate and regular rhythm.      Pulses: Normal pulses.      Heart sounds: Normal heart sounds. No murmur heard.  Pulmonary:      Effort: Pulmonary effort is normal. No respiratory distress.      Breath sounds: Normal breath sounds. No wheezing.   Musculoskeletal:         General: Normal range of motion.      Comments: He is full and unrestricted range of motion with flexion, extension, lateral flexion and rotation with regard to his lumbar spine.  He notes some general tightness with this range of motion but no pain.   Skin:     General: Skin is warm and dry.      Capillary Refill: Capillary refill takes less than 2 seconds.   Neurological:      Mental Status: He is alert and oriented to person, place, and time.   Psychiatric:         Mood and Affect: Mood normal.         Behavior: Behavior normal.         "

## 2024-09-24 NOTE — ASSESSMENT & PLAN NOTE
He does continue to have the left-sided chest pain that he saw  for back in June.  He did see a cardiologist who believed most of his symptoms were muscle in nature.  However, he was recommended to have a stress test done.  Patient has not yet scheduled this test and was encouraged to schedule this at his earliest convenience to rule out any cardiac pathology.  In the meantime he will continue to take his Flexeril as needed at bedtime.

## 2024-10-05 DIAGNOSIS — R07.89 OTHER CHEST PAIN: ICD-10-CM

## 2024-10-07 RX ORDER — CYCLOBENZAPRINE HCL 10 MG
10 TABLET ORAL
Qty: 30 TABLET | Refills: 0 | Status: SHIPPED | OUTPATIENT
Start: 2024-10-07 | End: 2024-11-06

## 2024-11-21 ENCOUNTER — OFFICE VISIT (OUTPATIENT)
Dept: URGENT CARE | Facility: CLINIC | Age: 34
End: 2024-11-21
Payer: COMMERCIAL

## 2024-11-21 VITALS
DIASTOLIC BLOOD PRESSURE: 84 MMHG | HEART RATE: 86 BPM | RESPIRATION RATE: 18 BRPM | WEIGHT: 155.4 LBS | TEMPERATURE: 97.5 F | BODY MASS INDEX: 23.63 KG/M2 | OXYGEN SATURATION: 99 % | SYSTOLIC BLOOD PRESSURE: 122 MMHG

## 2024-11-21 DIAGNOSIS — J02.9 ACUTE PHARYNGITIS, UNSPECIFIED ETIOLOGY: Primary | ICD-10-CM

## 2024-11-21 LAB — S PYO AG THROAT QL: NEGATIVE

## 2024-11-21 PROCEDURE — S9083 URGENT CARE CENTER GLOBAL: HCPCS | Performed by: PHYSICIAN ASSISTANT

## 2024-11-21 PROCEDURE — 87880 STREP A ASSAY W/OPTIC: CPT | Performed by: PHYSICIAN ASSISTANT

## 2024-11-21 PROCEDURE — 87070 CULTURE OTHR SPECIMN AEROBIC: CPT | Performed by: PHYSICIAN ASSISTANT

## 2024-11-21 PROCEDURE — G0382 LEV 3 HOSP TYPE B ED VISIT: HCPCS | Performed by: PHYSICIAN ASSISTANT

## 2024-11-21 RX ORDER — AMOXICILLIN 500 MG/1
500 CAPSULE ORAL EVERY 8 HOURS SCHEDULED
Qty: 30 CAPSULE | Refills: 0 | Status: SHIPPED | OUTPATIENT
Start: 2024-11-21 | End: 2024-12-01

## 2024-11-21 NOTE — PROGRESS NOTES
[facility]        NAME: Obdulio Barr is a 34 y.o. male  : 1990    MRN: [id number]  DATE: 2024  TIME: 3:53 PM    /84 (BP Location: Right arm, Patient Position: Sitting)   Pulse 86   Temp 97.5 °F (36.4 °C) (Tympanic)   Resp 18   Wt 70.5 kg (155 lb 6.4 oz)   SpO2 99%   BMI 23.63 kg/m²     Assessment and Plan   Acute pharyngitis, unspecified etiology [J02.9]  1. Acute pharyngitis, unspecified etiology  POCT rapid ANTIGEN strepA    Throat culture    amoxicillin (AMOXIL) 500 mg capsule            Patient Instructions       Follow up with PCP in 3-5 days.  Proceed to  ER if symptoms worsen.    Chief Complaint     Chief Complaint   Patient presents with    Sore Throat     Started a week ago with sore throat, denies fever, denies other symptoms, concerned for strep, tried dayquil and nyquil with no relief         History of Present Illness       Pt with sore throat for about 1 week     Sore Throat         Review of Systems   Review of Systems   Constitutional: Negative.    HENT:  Positive for sore throat.    Eyes: Negative.    Respiratory: Negative.     Cardiovascular: Negative.    Gastrointestinal: Negative.    Endocrine: Negative.    Genitourinary: Negative.    Musculoskeletal: Negative.    Skin: Negative.    Allergic/Immunologic: Negative.    Neurological: Negative.    Hematological: Negative.    Psychiatric/Behavioral: Negative.     All other systems reviewed and are negative.        Current Medications       Current Outpatient Medications:     amoxicillin (AMOXIL) 500 mg capsule, Take 1 capsule (500 mg total) by mouth every 8 (eight) hours for 10 days, Disp: 30 capsule, Rfl: 0    clindamycin (CLEOCIN T) 1 % lotion, Apply 1 application topically in the morning, Disp: , Rfl:     cyclobenzaprine (FLEXERIL) 10 mg tablet, Take 1 tablet (10 mg total) by mouth daily at bedtime, Disp: 30 tablet, Rfl: 0    famotidine (PEPCID) 20 mg tablet, Take 1 tablet (20 mg total) by mouth 2 (two)  times a day, Disp: 60 tablet, Rfl: 11    finasteride (PROPECIA) 1 MG tablet, Take 1 mg by mouth daily, Disp: , Rfl:     minoxidil (ROGAINE) 2 % external solution, Apply 5 Applications topically 2 (two) times a day Per patient uses daily 5Ml, Disp: , Rfl:     tretinoin (REFISSA) 0.05 % cream, APPLY PEA SIZED AMOUNT TO ENTIRE FACE EVERY NIGHT AT BEDTIME 1 HOUR AFTER WASHING FACE, Disp: , Rfl:     Current Allergies     Allergies as of 11/21/2024    (No Known Allergies)            The following portions of the patient's history were reviewed and updated as appropriate: allergies, current medications, past family history, past medical history, past social history, past surgical history and problem list.     Past Medical History:   Diagnosis Date    GERD (gastroesophageal reflux disease)        Past Surgical History:   Procedure Laterality Date    FINGER SURGERY      TYMPANOSTOMY TUBE PLACEMENT Left        Family History   Problem Relation Age of Onset    No Known Problems Mother     No Known Problems Father          Medications have been verified.        Objective   /84 (BP Location: Right arm, Patient Position: Sitting)   Pulse 86   Temp 97.5 °F (36.4 °C) (Tympanic)   Resp 18   Wt 70.5 kg (155 lb 6.4 oz)   SpO2 99%   BMI 23.63 kg/m²        Physical Exam     Physical Exam  Vitals and nursing note reviewed.   Constitutional:       Appearance: He is well-developed and normal weight.   HENT:      Head: Normocephalic and atraumatic.      Right Ear: Tympanic membrane and ear canal normal.      Left Ear: Tympanic membrane and ear canal normal.      Mouth/Throat:      Mouth: Mucous membranes are moist.      Pharynx: Posterior oropharyngeal erythema present.      Tonsils: No tonsillar exudate or tonsillar abscesses.   Eyes:      Conjunctiva/sclera: Conjunctivae normal.      Pupils: Pupils are equal, round, and reactive to light.   Cardiovascular:      Rate and Rhythm: Normal rate and regular rhythm.      Heart  sounds: Normal heart sounds.   Pulmonary:      Effort: Pulmonary effort is normal.      Breath sounds: Normal breath sounds.   Abdominal:      General: Bowel sounds are normal.      Palpations: Abdomen is soft.   Musculoskeletal:      Cervical back: Normal range of motion and neck supple.   Lymphadenopathy:      Cervical: Cervical adenopathy present.   Skin:     General: Skin is warm.      Capillary Refill: Capillary refill takes less than 2 seconds.   Neurological:      Mental Status: He is alert and oriented to person, place, and time.

## 2024-11-23 LAB — BACTERIA THROAT CULT: NORMAL

## 2024-11-25 ENCOUNTER — OFFICE VISIT (OUTPATIENT)
Dept: URGENT CARE | Age: 34
End: 2024-11-25
Payer: COMMERCIAL

## 2024-11-25 VITALS
OXYGEN SATURATION: 99 % | DIASTOLIC BLOOD PRESSURE: 72 MMHG | HEART RATE: 82 BPM | SYSTOLIC BLOOD PRESSURE: 118 MMHG | TEMPERATURE: 97.6 F | RESPIRATION RATE: 18 BRPM

## 2024-11-25 DIAGNOSIS — J06.9 UPPER RESPIRATORY TRACT INFECTION, UNSPECIFIED TYPE: Primary | ICD-10-CM

## 2024-11-25 PROCEDURE — G0382 LEV 3 HOSP TYPE B ED VISIT: HCPCS

## 2024-11-25 PROCEDURE — S9083 URGENT CARE CENTER GLOBAL: HCPCS

## 2024-11-25 RX ORDER — BROMPHENIRAMINE MALEATE, PSEUDOEPHEDRINE HYDROCHLORIDE, AND DEXTROMETHORPHAN HYDROBROMIDE 2; 30; 10 MG/5ML; MG/5ML; MG/5ML
5 SYRUP ORAL 4 TIMES DAILY PRN
Qty: 120 ML | Refills: 0 | Status: SHIPPED | OUTPATIENT
Start: 2024-11-25

## 2024-11-25 RX ORDER — FLUTICASONE PROPIONATE 50 MCG
1 SPRAY, SUSPENSION (ML) NASAL DAILY
Qty: 9.9 ML | Refills: 0 | Status: SHIPPED | OUTPATIENT
Start: 2024-11-25

## 2024-11-25 NOTE — PROGRESS NOTES
[facility]        NAME: Obdulio Barr is a 34 y.o. male  : 1990    MRN: [id number]  DATE: 2024  TIME: 1:32 PM    Assessment and Plan   Upper respiratory tract infection, unspecified type [J06.9]  1. Upper respiratory tract infection, unspecified type  fluticasone (FLONASE) 50 mcg/act nasal spray    brompheniramine-pseudoephedrine-DM 30-2-10 MG/5ML syrup        Rapid strep neg x5 days prior, no culture sent.  Pt currently on Amoxicillin.  No rashes.  Able to eat and drink well. He has not had any sick contacts.  He denies routine use of cough/cold medicine.  Cobblestone patterns to posterior pharynx. Discussed with pt his symptoms are most likely viral in nature and are within the 10-14 day window of length of treatments. Recommend use of warm salt water gargles, OTC allergy medicine with decongestants.   ER precautions.  Follow up with PCP in 3-5 days for recheck.     Patient Instructions       Follow up with PCP in 3-5 days.  Proceed to  ER if symptoms worsen.    If tests have been performed at [facility], our office will contact you with results if changes need to be made to the care plan discussed with you at the visit.  You can review your full results on [facility].    Chief Complaint     Chief Complaint   Patient presents with    Sore Throat     Pt reports sore throat and sinus congestion. Was seen by PCP and on amox for 10 days began last Thursday. Strep test was negative at time of visit.  Throat looks irritated, denies ear pain or fullness.          History of Present Illness       Pt is a 34 year old male presenting with 7 days of sore throat and congestion.  Currently on Amoxicillin for 5 days for sore throat, concerned he is not getting any better..  Has taken motrin and sudafed with minimal relief.  He reports chills, no measured fever.  He is eating and drinking well, denies abd pain, n/v/d.  Sore throat is worse at night when supine.     Sore Throat    Associated symptoms include congestion.       Review of Systems   Review of Systems   Constitutional:  Negative for activity change, appetite change, chills, fatigue, fever and unexpected weight change.   HENT:  Positive for congestion, postnasal drip and sore throat.          Current Medications       Current Outpatient Medications:     amoxicillin (AMOXIL) 500 mg capsule, Take 1 capsule (500 mg total) by mouth every 8 (eight) hours for 10 days, Disp: 30 capsule, Rfl: 0    brompheniramine-pseudoephedrine-DM 30-2-10 MG/5ML syrup, Take 5 mL by mouth 4 (four) times a day as needed for congestion, Disp: 120 mL, Rfl: 0    clindamycin (CLEOCIN T) 1 % lotion, Apply 1 application topically in the morning, Disp: , Rfl:     famotidine (PEPCID) 20 mg tablet, Take 1 tablet (20 mg total) by mouth 2 (two) times a day, Disp: 60 tablet, Rfl: 11    finasteride (PROPECIA) 1 MG tablet, Take 1 mg by mouth daily, Disp: , Rfl:     fluticasone (FLONASE) 50 mcg/act nasal spray, 1 spray into each nostril daily, Disp: 9.9 mL, Rfl: 0    minoxidil (ROGAINE) 2 % external solution, Apply 5 Applications topically 2 (two) times a day Per patient uses daily 5Ml, Disp: , Rfl:     tretinoin (REFISSA) 0.05 % cream, APPLY PEA SIZED AMOUNT TO ENTIRE FACE EVERY NIGHT AT BEDTIME 1 HOUR AFTER WASHING FACE, Disp: , Rfl:     cyclobenzaprine (FLEXERIL) 10 mg tablet, Take 1 tablet (10 mg total) by mouth daily at bedtime (Patient not taking: Reported on 11/25/2024), Disp: 30 tablet, Rfl: 0    Current Allergies     Allergies as of 11/25/2024    (No Known Allergies)            The following portions of the patient's history were reviewed and updated as appropriate: allergies, current medications, past family history, past medical history, past social history, past surgical history and problem list.     Past Medical History:   Diagnosis Date    GERD (gastroesophageal reflux disease)        Past Surgical History:   Procedure Laterality Date    FINGER SURGERY       TYMPANOSTOMY TUBE PLACEMENT Left        Family History   Problem Relation Age of Onset    No Known Problems Mother     No Known Problems Father          Medications have been verified.        Objective   /72   Pulse 82   Temp 97.6 °F (36.4 °C)   Resp 18   SpO2 99%   No LMP for male patient.       Physical Exam     Physical Exam  Vitals and nursing note reviewed.   Constitutional:       General: He is not in acute distress.     Appearance: He is well-developed and normal weight. He is not ill-appearing or toxic-appearing.   HENT:      Right Ear: Hearing normal. Tympanic membrane is injected.      Left Ear: Hearing normal. Tympanic membrane is injected.      Nose: Congestion and rhinorrhea present.      Mouth/Throat:      Mouth: Mucous membranes are moist.      Pharynx: Posterior oropharyngeal erythema present.      Tonsils: No tonsillar exudate or tonsillar abscesses. 0 on the right. 0 on the left.   Eyes:      Conjunctiva/sclera: Conjunctivae normal.   Cardiovascular:      Rate and Rhythm: Normal rate and regular rhythm.   Pulmonary:      Effort: Pulmonary effort is normal. No respiratory distress.      Breath sounds: Normal breath sounds. No stridor. No wheezing, rhonchi or rales.   Chest:      Chest wall: No tenderness.   Lymphadenopathy:      Cervical: No cervical adenopathy.   Skin:     General: Skin is warm.      Capillary Refill: Capillary refill takes less than 2 seconds.   Neurological:      General: No focal deficit present.      Mental Status: He is alert.

## 2024-11-25 NOTE — PATIENT INSTRUCTIONS
Please trial warm salt water gargles, Chloraseptic spray, Cepacol cough drops and warm tea with honey as needed for sore throat.     Prop head of bed at night to reduce Post Nasal Drip.      Bromphed as needed for congestion.  Flonase: 1 spray each nostril as needed for congestion.    Nearly all of upper respiratory infections in adults are the result of viruses. These viruses include COVID, flu, RSV, adenoviruses and other coronaviruses. Antibiotics do not treat viruses and are not recommended or indicated at this time.   Take over the counter medications as needed.   Recommend over the counter decongestants as needed and age appropriate.   Neti Pot rinses and saline nasal sprays may also help clear nasal and/or sinus congestion. Frequent suctioning (before/after naps and nighttime sleep) can help symptoms in infants and young children  Recommend Mucinex to assist in the break-up of chest congestion in adults. Recommend Zarbee's cold over the counter treatments for young children (under the age of 2).   Also may consider over the counter allergy medications such as Allegra-D and Zyrtec.   If symptoms persist for 7+ days, follow-up with primary care provider or return to clinic to discuss appropriateness of antibiotics.   Vaccination is important to help prevent the spread of disease and infants. Vaccines are available for COVID and influenza for ages 6 months and older. Please discuss scheduling vaccines with your PCP.    If symptoms worsen, shortness of breath develops, you experience severe sinus pain, difficulty swallowing or changes in voice, report to the emergency department.    RSV: When It's More Than Just a Cold - [email or web address]

## 2025-07-31 ENCOUNTER — OFFICE VISIT (OUTPATIENT)
Dept: FAMILY MEDICINE CLINIC | Facility: CLINIC | Age: 35
End: 2025-07-31
Payer: COMMERCIAL

## 2025-07-31 VITALS
HEIGHT: 68 IN | WEIGHT: 154.8 LBS | TEMPERATURE: 98.1 F | BODY MASS INDEX: 23.46 KG/M2 | RESPIRATION RATE: 12 BRPM | HEART RATE: 74 BPM | OXYGEN SATURATION: 99 % | DIASTOLIC BLOOD PRESSURE: 69 MMHG | SYSTOLIC BLOOD PRESSURE: 115 MMHG

## 2025-07-31 DIAGNOSIS — E55.9 VITAMIN D DEFICIENCY: ICD-10-CM

## 2025-07-31 DIAGNOSIS — K21.9 GASTROESOPHAGEAL REFLUX DISEASE WITHOUT ESOPHAGITIS: Primary | ICD-10-CM

## 2025-07-31 DIAGNOSIS — Z13.6 SCREENING FOR CARDIOVASCULAR CONDITION: ICD-10-CM

## 2025-07-31 PROCEDURE — 99204 OFFICE O/P NEW MOD 45 MIN: CPT | Performed by: FAMILY MEDICINE

## 2025-07-31 RX ORDER — FAMOTIDINE 40 MG/1
40 TABLET, FILM COATED ORAL 2 TIMES DAILY
Qty: 180 TABLET | Refills: 0 | Status: SHIPPED | OUTPATIENT
Start: 2025-07-31 | End: 2026-07-26

## 2025-07-31 RX ORDER — PANTOPRAZOLE SODIUM 40 MG/1
40 TABLET, DELAYED RELEASE ORAL 2 TIMES DAILY
Qty: 180 TABLET | Refills: 0 | Status: SHIPPED | OUTPATIENT
Start: 2025-07-31 | End: 2025-08-01 | Stop reason: CLARIF

## 2025-07-31 RX ORDER — L.ACID/L.CASEI/B.BIF/B.LON/FOS 2B CELL-50
1 CAPSULE ORAL DAILY
COMMUNITY

## 2025-08-01 ENCOUNTER — NURSE TRIAGE (OUTPATIENT)
Age: 35
End: 2025-08-01

## 2025-08-01 DIAGNOSIS — K21.9 GASTROESOPHAGEAL REFLUX DISEASE WITHOUT ESOPHAGITIS: ICD-10-CM

## 2025-08-01 RX ORDER — PANTOPRAZOLE SODIUM 40 MG/1
40 TABLET, DELAYED RELEASE ORAL 2 TIMES DAILY
Qty: 180 TABLET | Refills: 0 | Status: SHIPPED | OUTPATIENT
Start: 2025-08-01 | End: 2026-07-27

## 2025-08-02 ENCOUNTER — APPOINTMENT (OUTPATIENT)
Dept: LAB | Facility: CLINIC | Age: 35
End: 2025-08-02
Attending: FAMILY MEDICINE
Payer: COMMERCIAL

## 2025-08-02 DIAGNOSIS — E55.9 VITAMIN D DEFICIENCY: ICD-10-CM

## 2025-08-02 DIAGNOSIS — Z13.6 SCREENING FOR CARDIOVASCULAR CONDITION: ICD-10-CM

## 2025-08-02 LAB
25(OH)D3 SERPL-MCNC: 33.3 NG/ML (ref 30–100)
ALBUMIN SERPL BCG-MCNC: 4.3 G/DL (ref 3.5–5)
ALP SERPL-CCNC: 36 U/L (ref 34–104)
ALT SERPL W P-5'-P-CCNC: 38 U/L (ref 7–52)
ANION GAP SERPL CALCULATED.3IONS-SCNC: 8 MMOL/L (ref 4–13)
AST SERPL W P-5'-P-CCNC: 79 U/L (ref 13–39)
BILIRUB SERPL-MCNC: 0.67 MG/DL (ref 0.2–1)
BUN SERPL-MCNC: 15 MG/DL (ref 5–25)
CALCIUM SERPL-MCNC: 9.5 MG/DL (ref 8.4–10.2)
CHLORIDE SERPL-SCNC: 105 MMOL/L (ref 96–108)
CHOLEST SERPL-MCNC: 197 MG/DL (ref ?–200)
CO2 SERPL-SCNC: 25 MMOL/L (ref 21–32)
CREAT SERPL-MCNC: 0.76 MG/DL (ref 0.6–1.3)
ERYTHROCYTE [DISTWIDTH] IN BLOOD BY AUTOMATED COUNT: 12.7 % (ref 11.6–15.1)
GFR SERPL CREATININE-BSD FRML MDRD: 119 ML/MIN/1.73SQ M
GLUCOSE P FAST SERPL-MCNC: 86 MG/DL (ref 65–99)
HCT VFR BLD AUTO: 40.9 % (ref 36.5–49.3)
HDLC SERPL-MCNC: 53 MG/DL
HGB BLD-MCNC: 14 G/DL (ref 12–17)
LDLC SERPL CALC-MCNC: 128 MG/DL (ref 0–100)
MCH RBC QN AUTO: 28 PG (ref 26.8–34.3)
MCHC RBC AUTO-ENTMCNC: 34.2 G/DL (ref 31.4–37.4)
MCV RBC AUTO: 82 FL (ref 82–98)
NONHDLC SERPL-MCNC: 144 MG/DL
PLATELET # BLD AUTO: 304 THOUSANDS/UL (ref 149–390)
PMV BLD AUTO: 9.2 FL (ref 8.9–12.7)
POTASSIUM SERPL-SCNC: 3.8 MMOL/L (ref 3.5–5.3)
PROT SERPL-MCNC: 7.2 G/DL (ref 6.4–8.4)
RBC # BLD AUTO: 5 MILLION/UL (ref 3.88–5.62)
SODIUM SERPL-SCNC: 138 MMOL/L (ref 135–147)
TRIGL SERPL-MCNC: 80 MG/DL (ref ?–150)
TSH SERPL DL<=0.05 MIU/L-ACNC: 1.7 UIU/ML (ref 0.45–4.5)
WBC # BLD AUTO: 4.67 THOUSAND/UL (ref 4.31–10.16)

## 2025-08-02 PROCEDURE — 84443 ASSAY THYROID STIM HORMONE: CPT

## 2025-08-02 PROCEDURE — 36415 COLL VENOUS BLD VENIPUNCTURE: CPT

## 2025-08-02 PROCEDURE — 80053 COMPREHEN METABOLIC PANEL: CPT

## 2025-08-02 PROCEDURE — 85027 COMPLETE CBC AUTOMATED: CPT

## 2025-08-02 PROCEDURE — 82306 VITAMIN D 25 HYDROXY: CPT

## 2025-08-02 PROCEDURE — 80061 LIPID PANEL: CPT
